# Patient Record
Sex: MALE | Race: WHITE
[De-identification: names, ages, dates, MRNs, and addresses within clinical notes are randomized per-mention and may not be internally consistent; named-entity substitution may affect disease eponyms.]

---

## 2017-04-11 ENCOUNTER — HOSPITAL ENCOUNTER (EMERGENCY)
Dept: HOSPITAL 25 - ED | Age: 57
LOS: 1 days | Discharge: HOME | End: 2017-04-12
Payer: COMMERCIAL
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
## 2017-04-11 DIAGNOSIS — S49.91XA: Primary | ICD-10-CM
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
## 2017-04-11 PROCEDURE — 99282 EMERGENCY DEPT VISIT SF MDM: CPT

## 2017-04-12 VITALS — DIASTOLIC BLOOD PRESSURE: 89 MMHG | SYSTOLIC BLOOD PRESSURE: 156 MMHG

## 2017-04-12 NOTE — RAD
INDICATION: Left hand injury     



COMPARISON: None



TECHNIQUE: AP, lateral, and oblique views were obtained.



FINDINGS: There is no acute fracture. There is a healed fracture the proximal phalanx of

the third digit. The articular relationships and soft tissues are normal for age.



IMPRESSION: NO ACUTE FRACTURE.

## 2017-04-12 NOTE — RAD
INDICATION: Right shoulder injury     



COMPARISON: None



TECHNIQUE: Routine frontal and Y views were obtained.



FINDINGS: There are no acute bony findings. 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

The glenohumeral joint is intact. The soft tissues are normal.



IMPRESSION: NO ACUTE FINDINGS.

## 2017-04-12 NOTE — ED
Sole VALENZUELA Erika, scribed for Jack Lopez MD on 04/11/17 at 2234 .





Adult Trauma





- HPI Summary


HPI Summary: 


Patient is a 57-year-old male presenting to the ED with a CC of constant, sudden

-onset right shoulder pain s/p fall yesterday. Patient reports that he was 

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 

4th finger. Patient reports pain to both of these locations. Shoulder pain is 

aggravated by moving the arm.





- History of Current Complaint


Chief Complaint: EDExtremityUpper


Stated Complaint: FALL/ RT SHOULDER /SWOLLEN LT RING FINGER


Time Seen by Provider: 04/11/17 22:23


Hx Obtained From: Patient


Mechanism of Injury: Fall


Onset/Duration: Started Days Ago, Traumatic, Still Present


Current Severity: Moderate


Pain Intensity: 8


Pain Scale Used: 0-10 Numeric


Location: Extremities - R shoulder, L 4th finger


Aggravating Factor(s): Movement





- Allergy/Home Medications


Allergies/Adverse Reactions: 


 Allergies











Allergy/AdvReac Type Severity Reaction Status Date / Time


 


No Known Allergies Allergy   Verified 04/11/17 21:19














PMH/Surg Hx/FS Hx/Imm Hx


Endocrine/Hematology History: Reports: Hx Diabetes


Cardiovascular History: Reports: Hx Hypertension


Infectious Disease History: No


Infectious Disease History: 


   Denies: Traveled Outside the [place] in Last 30 Days





- Family History


Known Family History: Positive: Cardiac Disease, Hypertension, Respiratory 

Disease - COPD





- Social History


Occupation: Employed Full-time


Lives: With Family


Alcohol Use: None


Hx Substance Use: No


Substance Use Type: Reports: None


Hx Tobacco Use: Yes - chewing tobacco


Smoking Status (MU): Never Smoked Tobacco


Do You Chew or Dip Tobacco: Yes





Review of Systems


Negative: Fever


Positive: Arthralgia - R shoulder, L 4th finger


All Other Systems Reviewed And Are Negative: Yes





Physical Exam


Triage Information Reviewed: Yes


Vital Signs On Initial Exam: 


 Initial Vitals











Temp Pulse Resp BP Pulse Ox


 


 98.2 F   87   15   140/77   97 


 


 04/11/17 21:13  04/11/17 21:13  04/11/17 21:13  04/11/17 21:13  04/11/17 21:13











Completion Of Physical Exam Limited Due To: Dementia


Appearance: Positive: Well-Appearing, No Pain Distress


Skin: Positive: Warm, Skin Color Reflects Adequate Perfusion, Dry


Head/Face: Positive: Normal Head/Face Inspection


Eyes: Positive: Normal


ENT: Positive: Normal ENT inspection


Neck: Positive: Supple, Nontender


Respiratory/Lung Sounds: Positive: Clear to Auscultation, Breath Sounds Present


Cardiovascular: Positive: RRR


Abdomen Description: Positive: Nontender, Soft


Bowel Sounds: Positive: Present


Musculoskeletal: Positive: Other - Deformed right upper arm and shoulder. NV 

and motor intact


Neurological: Positive: Normal


Psychiatric: Positive: Affect/Mood Appropriate





Diagnostics





- Vital Signs


 Vital Signs











  Temp Pulse Resp BP Pulse Ox


 


 04/11/17 21:15  98.2 F  87  15  140/77  97


 


 04/11/17 21:13  98.2 F  87  15  140/77  97














- Laboratory


Lab Statement: Any lab studies that have been ordered have been reviewed, and 

results considered in the medical decision making process.





- Radiology


  ** L Hand XR


Xray Interpretation: No Acute Changes


Radiology Interpretation Completed By: ED Physician





  ** R Humerus XR


Xray Interpretation: No Acute Changes


Radiology Interpretation Completed By: ED Physician





  ** R Shoulder XR


Radiology Interpretation Completed By: Radiologist - See [facility], pending at 

this time





Adult Trauma Course/Dx





- Course


Course Of Treatment: Mr. Mackenzie has a great deal of pain in his right 

shoulder to any ROM without an radiographic findings.  I will place him in an 

immobilizer and encourage F/U.  I warned him not to use the immobilizer more 

than a week without F/U.





- Diagnoses


Provider Diagnoses: 


 Shoulder injury








Discharge





- Discharge Plan


Condition: Stable


Disposition: HOME


Prescriptions: 


HYDROcodone/ACETAMIN 5-325 MG* [Norco 5-325 TAB*] 1 tab PO Q6H PRN #20 tab MDD 4


 PRN Reason: Pain


Patient Education Materials:  Shoulder Sprain (ED)


Referrals: 


Renay Tolliver MD [Primary Care Provider] - 


Additional Instructions: 


USE SHOULDER IMMOBILIZER. FOLLOW UP WITH YOUR PRIMARY CARE PHYSICIAN WITHIN A 

WEEK. DO NOT USE THE IMMOBILIZER FOR MORE THAN A WEEK. TAKE MEDICATION AS 

DIRECTED.





The documentation as recorded by the Sole shafer Erika accurately reflects 

the service I personally performed and the decisions made by me, Jack Lopez MD.

## 2017-04-12 NOTE — RAD
INDICATION: Injury right humerus     



COMPARISON: None



TECHNIQUE: AP, lateral, and oblique views were obtained.



FINDINGS: The bony structures, joint spaces, and soft tissues are normal for age.



IMPRESSION: NO ACUTE BONY FINDINGS

## 2017-08-22 ENCOUNTER — HOSPITAL ENCOUNTER (EMERGENCY)
Dept: HOSPITAL 25 - UCEAST | Age: 57
Discharge: HOME | End: 2017-08-22
Payer: COMMERCIAL

## 2017-08-22 VITALS — SYSTOLIC BLOOD PRESSURE: 137 MMHG | DIASTOLIC BLOOD PRESSURE: 85 MMHG

## 2017-08-22 DIAGNOSIS — I10: ICD-10-CM

## 2017-08-22 DIAGNOSIS — F17.220: ICD-10-CM

## 2017-08-22 DIAGNOSIS — E11.9: ICD-10-CM

## 2017-08-22 DIAGNOSIS — Z79.84: ICD-10-CM

## 2017-08-22 DIAGNOSIS — B02.9: Primary | ICD-10-CM

## 2017-08-22 PROCEDURE — G0463 HOSPITAL OUTPT CLINIC VISIT: HCPCS

## 2017-08-22 PROCEDURE — 99212 OFFICE O/P EST SF 10 MIN: CPT

## 2017-08-22 NOTE — ED
Skin Complaint





- HPI Summary


HPI Summary: 





Patient presents to the  with vesciular lesions under the right breast, right 

side body, right side of upper back which are following a linear pattern.  He 

has had chicken pox as a child.  He endorses pain over the lesions and has been 

using Vaseline for relief.  He first noticed the lesions approximately 3-4 days 

ago.  They have not gotten better or worse, but that pain has increased.  He 

states he has been under increased stress lately and PMHx includes diabetes and 

HTN.  Denies other complaints at this time.  No lesions are noted in the ear or 

around the eyes.  Denies numbness or tingling.





- History of Current Complaint


Chief Complaint: UCSkin


Time Seen by Provider: 08/22/17 19:35


Stated Complaint: BLISTERS AND RASH ON TORSO


Hx Obtained From: Patient


Onset/Duration: Started Days Ago - 4


Timing: Constant


Onset Severity: Moderate


Current Severity: Moderate


Pain Intensity: 0


Pain Scale Used: 0-10 Numeric


Skin Location: Chest - and back


Character: Swelling, Redness, Raised, Painful


Aggravating Symptom(s): Nothing


Alleviating Symptom(s): Nothing


Associated Signs & Symptoms: Negative


Related History: Possible Reaction to: Environmental Exposure





- Allergy/Home Medications


Allergies/Adverse Reactions: 


 Allergies











Allergy/AdvReac Type Severity Reaction Status Date / Time


 


No Known Allergies Allergy   Verified 08/22/17 19:30











Home Medications: 


 Home Medications





Atorvastatin* [Lipitor*] 40 mg PO 1700 08/22/17 [History Confirmed 08/22/17]


Gabapentin CAP(*) [Neurontin 300 CAP(*)] 300 mg PO DAILY 08/22/17 [History 

Confirmed 08/22/17]


Ibuprofen TAB* [Motrin TAB* 800 MG] 800 mg PO Q8H PRN 08/22/17 [History 

Confirmed 08/22/17]


Losartan Potassium [Cozaar] 50 mg PO DAILY 08/22/17 [History Confirmed 08/22/17]


Metformin HCl [Fortamet] 1,000 mg PO BID 08/22/17 [History Confirmed 08/22/17]


Phentermine HCl 37.5 mg PO DAILY 08/22/17 [History Confirmed 08/22/17]


Sitagliptin Phosphate [Januvia] 100 mg PO DAILY 08/22/17 [History Confirmed 08/ 22/17]


glipiZIDE TAB.XL* [Glucotrol XL*] 10 mg PO DAILY 08/22/17 [History Confirmed 08/ 22/17]











PMH/Surg Hx/FS Hx/Imm Hx


Previously Healthy: No - see below


Endocrine/Hematology History: Reports: Hx Diabetes


Cardiovascular History: Reports: Hx Hypertension





- Surgical History


Surgery Procedure, Year, and Place: HERNIA REPAIR





- Immunization History


Hx Pertussis Vaccination: No


Immunizations Up to Date: Unable to Obtain/Confirm


Infectious Disease History: No


Infectious Disease History: 


   Denies: Traveled Outside the [place] in Last 30 Days





- Family History


Known Family History: Positive: Cardiac Disease, Hypertension, Respiratory 

Disease - COPD





- Social History


Occupation: Employed Full-time


Lives: With Family


Alcohol Use: None


Hx Substance Use: No


Substance Use Type: Reports: None


Hx Tobacco Use: Yes - chewing tobacco


Smoking Status (MU): Never Smoked Tobacco


Type: Smokeless Tobacco





Review of Systems


Constitutional: Negative


Eyes: Negative


Cardiovascular: Negative


Respiratory: Negative


Positive: no symptoms reported, see HPI


Positive: Arthralgia, Myalgia - pain in right side over lesions


Positive: Other - vesicular lesions with minimal weeping


Neurological: Negative


Psychological: Normal


All Other Systems Reviewed And Are Negative: Yes





Physical Exam


Triage Information Reviewed: Yes


Vital Signs On Initial Exam: 


 Initial Vitals











Temp Pulse Resp BP Pulse Ox


 


 98.9 F   87   18   137/85   100 


 


 08/22/17 19:26  08/22/17 19:26  08/22/17 19:26  08/22/17 19:26  08/22/17 19:26











Vital Signs Reviewed: Yes


Appearance: Positive: Well-Appearing, Well-Nourished


Skin: Positive: Warm, Weeping Skin/Lesions - vesicles


Head/Face: Positive: Normal Head/Face Inspection, Temporal Artery Tenderness


Eyes: Positive: Normal, EOMI, JACKELYN, Conjunctiva Clear


Neck: Positive: Supple, No Lymphadenopathy


Respiratory/Lung Sounds: Positive: Clear to Auscultation, Breath Sounds Present


Cardiovascular: Positive: Normal


Musculoskeletal: Positive: Strength/ROM Intact


Neurological: Positive: Other - nerve pain over vesicular lesions


Psychiatric: Positive: Normal


AVPU Assessment: Alert





Diagnostics





- Vital Signs


 Vital Signs











  Temp Pulse Resp BP Pulse Ox


 


 08/22/17 19:26  98.9 F  87  18  137/85  100














- Laboratory


Lab Statement: Any lab studies that have been ordered have been reviewed, and 

results considered in the medical decision making process.





Course/Dx





- Course


Course Of Treatment: Patient evaluated for acute vesicular rash.  Pain is 

located over the rash, but denies elsewhere.  Chickenpox as a child and 

currently immunocompromised with increased stress levels and workload, diabetes 

and HTN.  The lesions are vesicles which are weeping and not crusted over.  

They follow a dermatome pattern and are painful to touch.  Valcyclovir 1g TID 

ordered for management.  Patient currently taking Gabapentin for neuropathy.  

Will defer to PCP for further pain management or increase dose of gabapentin if 

nerve pain worsens.  Patient is OK with discharge and return precautions and 

managment are given.





- Differential Diagnoses - Skin Complaint


Differential Diagnoses: Poison Ivy, Poison Oak, Varicella Zoster





- Diagnoses


Provider Diagnoses: 


 Varicella zoster








Discharge





- Discharge Plan


Condition: Stable


Disposition: HOME


Prescriptions: 


ValACYclovir (*) [Valtrex 1 GM(*)] 1 gm PO TID #21 tab MDD 3


Patient Education Materials:  Shingles (ED)


Referrals: 


Renay Tolliver MD [Primary Care Provider] - 


Additional Instructions: 


PREVENTING TRANSMISSION TO OTHERS 





Patients with herpes zoster can transmit varicella zoster virus (VZV) to 

individuals who have not had varicella and have not received the varicella 

vaccine. VZV is transmitted from person to person by direct contact or by 

aerosolization of virus from skin lesions. In general, VZV is much less 

transmissible from a person presenting with herpes zoster than from a person 

presenting with varicella. 





Patients with localized zoster are not infectious before vesicles appear and 

are no longer infectious when the lesions have re-epithelized. For those with 

active lesions, there are no specific precautions within the community setting. 

However, patients should be counseled about the risk of viral transmission to 

others. In addition, until the rash has crusted, patients should be advised to: 





Keep the rash covered, if feasible, and to wash their hands often to prevent 

the spread of virus to others. 








Avoid contact with pregnant women who have never had chickenpox or the 

varicella vaccine, premature or low birth weight infants, and immunocompromised 

individuals








Images





- Images


Full Body (No Head): 


  __________________________














  __________________________





 1 - vesicular lesions with minimal weeping





 2 - vesicular lesions following dermatome pattern

## 2018-05-21 ENCOUNTER — HOSPITAL ENCOUNTER (EMERGENCY)
Dept: HOSPITAL 25 - UCEAST | Age: 58
Discharge: HOME | End: 2018-05-21
Payer: COMMERCIAL

## 2018-05-21 VITALS — SYSTOLIC BLOOD PRESSURE: 140 MMHG | DIASTOLIC BLOOD PRESSURE: 83 MMHG

## 2018-05-21 DIAGNOSIS — T15.01XA: Primary | ICD-10-CM

## 2018-05-21 DIAGNOSIS — I10: ICD-10-CM

## 2018-05-21 DIAGNOSIS — E11.9: ICD-10-CM
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
## 2018-05-21 DIAGNOSIS — Z72.0: ICD-10-CM

## 2018-05-21 DIAGNOSIS — B02.9: ICD-10-CM
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
## 2018-05-21 PROCEDURE — G0463 HOSPITAL OUTPT CLINIC VISIT: HCPCS

## 2018-05-21 PROCEDURE — 99211 OFF/OP EST MAY X REQ PHY/QHP: CPT

## 2018-05-21 NOTE — UC
Renetta VALENZUELA Emily, scribed for Shankar Erwin MD on 05/21/18 at 0936 .





Eye Complaint HPI





- HPI Summary


HPI Summary: 


In Room:


This patient is a 58 year old M presenting to convenient care with a chief 

complaint of foreign body in R eye that he noticed at 1100 yesterday. Pt 

reports that he snapped the axle on his trailer, and had to repair it. Pt 

reports he believes something may have gone into his eye at this point. The 

patient rates the pain 6/10 in severity. Symptoms aggravated by nothing. 

Symptoms alleviated by nothing. Patient denies cough, CP, and SOB.





MD:


Vital signs stable. Afebrile. 6/10 R eye discomfort. Visit history 

noncontributory. Pt has HTN, diabetes, tobacco use and has had shingles.





Nurses:


Pt complains of fb in right eye. Pt states unsure of what is in right eye. Pt 

states pain began 5/20/18.








- History of Current Complaint


Chief Complaint: UCEye


Stated Complaint: EYE COMPLAINT


Time Seen by Provider: 05/21/18 09:11


Hx Obtained From: Patient


Onset/Duration: Sudden Onset, Lasting Hours, Still Present


Timing: Constant


Severity Initially: Moderate


Severity Currently: Moderate


Pain Intensity: 6


Pain Scale Used: 0-10 Numeric


Character: Foreign Body Sensation


Aggravating Factor(s): Nothing


Alleviating Factor(s): Nothing





- Allergies/Home Medications


Allergies/Adverse Reactions: 


 Allergies











Allergy/AdvReac Type Severity Reaction Status Date / Time


 


No Known Allergies Allergy   Verified 05/21/18 08:51











Home Medications: 


 Home Medications





Tetrahydroz/Peg 400/Hyprom/Gly [Visine Max Redness Relief Drop] 2 drop RIGHT 

EYE Q1HR PRN 05/21/18 [History Confirmed 05/21/18]











PMH/Surg Hx/FS Hx/Imm Hx


Previously Healthy: No


Endocrine History: Diabetes


Cardiovascular History: Hypertension





- Surgical History


Surgical History: Yes


Surgery Procedure, Year, and Place: HERNIA REPAIR.  Right shoulder surgery





- Family History


Known Family History: Positive: Cardiac Disease, Hypertension, Respiratory 

Disease - COPD





- Social History


Occupation: Retired


Lives: With Family


Alcohol Use: None


Substance Use Type: None


Smoking Status (MU): Never Smoked Tobacco


Type: Smokeless Tobacco


Amount Used/How Often: 2 can weekly





Review of Systems


Eyes: Other - Positive foreign body sensation in R eye


Respiratory: Other - Negative cough and SOB


Cardiovascular: Other - Negative CP


All Other Systems Reviewed And Are Negative: Yes





Physical Exam





- Summary


Physical Exam Summary: 


Appearance: The patient is well-appearing, is in no pain distress, and is well-

nourished.


Eyes: PUPILS EQUAL AND REACTIVE TO LIGHT AND ACCOMODATION. NO VISIBLE INJURY. 

RIGHT SCLERA SLIGHTLY INJECTED. EXAMINATION OF THE EYE SHOWS A PINPOINT FOREIGN 

BODY THAT IS PROBABLY RUST AT 7 OCLOCK IN THE RIGHT EYE.


ENT: The hearing is grossly normal, the pharynx is normal, and the TMs are 

normal. There is no muffled or hoarse voice.


Neck: The neck is supple and there is no lymphadenopathy.


Respiratory: The chest is nontender. The lungs are clear, there are normal 

breath sounds, and there is no respiratory distress.


Cardiovascular: Heart is regular rate and rhythm. There is no murmur.


Abdomen: The abdomen is soft and nontender. There is no organomegaly.


Bowel sounds: present


Musculoskeletal: Strength is intact. The patient moves all extremities.


Neurological: The patient is alert.


Psychological: The patient displays age appropriate behavior


Skin: Negative for rashes. 





Triage Information Reviewed: Yes


Vital Signs: 


 Initial Vital Signs











Temp  98.1 F   05/21/18 08:54


 


Pulse  81   05/21/18 08:54


 


Resp  16   05/21/18 08:54


 


BP  140/83   05/21/18 08:54


 


Pulse Ox  98   05/21/18 08:54











Vital Signs Reviewed: Yes





Eye Complaint Course/Dx





- Course


Course Of Treatment: Examination of the eye shows a pinpoint foreign body that 

is probably rust at 7 oclock in the right eye. After fluorescein and tetracaine

, attempt to remove it was made with a moist swab. Since this occurred yesterday

, the foreign body appears to be embedded. He will be seen and evaluated by Dr. Bose. Elevated BP but has current hypertension diagnosis and treatment. This 

should be rechecked a few times in the next month. Medications have been 

included in the original chart and reviewed.





- Differential Dx/Diagnosis


Provider Diagnoses: Corneal foreign body, 1 day old.





Discharge





- Sign-Out/Discharge


Documenting (check all that apply): Discharge/Admit/Transfer





- Discharge Plan


Condition: Stable


Disposition: HOME


Patient Education Materials:  Eye Foreign Body (ED)


Referrals: 


Renay Tolliver MD [Primary Care Provider] - 


Additional Instructions: 


SEEK CARE AT THE EMERGENCY DEPARTMENT IF SYMPTOMS WORSEN OR IF NEW SYMPTOMS 

DEVELOP.


FOLLOW UP WITH YOUR PRIMARY CARE PHYSICIAN.


Your blood pressure reading today was 140/83, indicating HYPERTENSION. Follow-

up with your primary care provider within 4 weeks for blood pressure readings 

and further evaluation. 





AS WE DISCUSSED:  





1.  You have a foreign body, probably rust, at 7 o'clock in the right eye.





2.  Go to Dr. Bose's office to have this removed.  They are expecting you.











- Billing Disposition and Condition


Condition: STABLE


Disposition: HOME





The documentation as recorded by the Renetta shafer Emily accurately reflects the 

service I personally performed and the decisions made by me, Shankar Erwin MD.

## 2019-03-25 ENCOUNTER — HOSPITAL ENCOUNTER (OUTPATIENT)
Dept: HOSPITAL 25 - ED | Age: 59
Setting detail: OBSERVATION
LOS: 1 days | Discharge: HOME | End: 2019-03-26
Attending: HOSPITALIST | Admitting: HOSPITALIST
Payer: COMMERCIAL

## 2019-03-25 DIAGNOSIS — I10: ICD-10-CM

## 2019-03-25 DIAGNOSIS — E86.0: ICD-10-CM

## 2019-03-25 DIAGNOSIS — E78.5: ICD-10-CM

## 2019-03-25 DIAGNOSIS — Z79.82: ICD-10-CM

## 2019-03-25 DIAGNOSIS — F17.210: ICD-10-CM

## 2019-03-25 DIAGNOSIS — E11.9: ICD-10-CM

## 2019-03-25 DIAGNOSIS — R20.0: Primary | ICD-10-CM

## 2019-03-25 LAB
ALBUMIN SERPL BCG-MCNC: 4.2 G/DL (ref 3.2–5.2)
ALBUMIN/GLOB SERPL: 1.3 {RATIO} (ref 1–3)
ALP SERPL-CCNC: 102 U/L (ref 34–104)
ALT SERPL W P-5'-P-CCNC: 22 U/L (ref 7–52)
ANION GAP SERPL CALC-SCNC: 10 MMOL/L (ref 2–11)
APTT PPP: 31.4 SECONDS (ref 26–36.3)
AST SERPL-CCNC: 23 U/L (ref 13–39)
BASOPHILS # BLD AUTO: 0.1 10^3/UL (ref 0–0.2)
BUN SERPL-MCNC: 28 MG/DL (ref 6–24)
BUN/CREAT SERPL: 17.3 (ref 8–20)
CALCIUM SERPL-MCNC: 9.3 MG/DL (ref 8.6–10.3)
CHLORIDE SERPL-SCNC: 107 MMOL/L (ref 101–111)
CHOLEST SERPL-MCNC: 118 MG/DL
EOSINOPHIL # BLD AUTO: 0.3 10^3/UL (ref 0–0.6)
GLOBULIN SER CALC-MCNC: 3.3 G/DL (ref 2–4)
GLUCOSE SERPL-MCNC: 241 MG/DL (ref 70–100)
HCO3 SERPL-SCNC: 22 MMOL/L (ref 22–32)
HCT VFR BLD AUTO: 45 % (ref 36–46)
HDLC SERPL-MCNC: 31.7 MG/DL
HGB BLD-MCNC: 15.1 G/DL (ref 14–18)
INR PPP/BLD: 0.84 (ref 0.77–1.02)
LYMPHOCYTES # BLD AUTO: 2.7 10^3/UL (ref 1–4.8)
MCH RBC QN AUTO: 29 PG (ref 27–31)
MCHC RBC AUTO-ENTMCNC: 34 G/DL (ref 31–36)
MCV RBC AUTO: 87 FL (ref 80–94)
MONOCYTES # BLD AUTO: 1.3 10^3/UL (ref 0–0.8)
NEUTROPHILS # BLD AUTO: 6.4 10^3/UL (ref 1.5–7.7)
NRBC # BLD AUTO: 0 10^3/UL
NRBC BLD QL AUTO: 0.1
PLATELET # BLD AUTO: 213 10^3/UL (ref 150–450)
POTASSIUM SERPL-SCNC: 4.2 MMOL/L (ref 3.5–5)
PROT SERPL-MCNC: 7.5 G/DL (ref 6.4–8.9)
RBC # BLD AUTO: 5.16 10^6 /UL (ref 4.18–5.48)
SODIUM SERPL-SCNC: 139 MMOL/L (ref 135–145)
TRIGL SERPL-MCNC: 294 MG/DL
TROPONIN I SERPL-MCNC: 0.01 NG/ML (ref ?–0.04)
WBC # BLD AUTO: 10.8 10^3/UL (ref 3.5–10.8)

## 2019-03-25 PROCEDURE — 93005 ELECTROCARDIOGRAM TRACING: CPT

## 2019-03-25 PROCEDURE — 36415 COLL VENOUS BLD VENIPUNCTURE: CPT

## 2019-03-25 PROCEDURE — G0378 HOSPITAL OBSERVATION PER HR: HCPCS

## 2019-03-25 PROCEDURE — 85730 THROMBOPLASTIN TIME PARTIAL: CPT

## 2019-03-25 PROCEDURE — 96372 THER/PROPH/DIAG INJ SC/IM: CPT

## 2019-03-25 PROCEDURE — 81003 URINALYSIS AUTO W/O SCOPE: CPT

## 2019-03-25 PROCEDURE — 80048 BASIC METABOLIC PNL TOTAL CA: CPT

## 2019-03-25 PROCEDURE — 83605 ASSAY OF LACTIC ACID: CPT

## 2019-03-25 PROCEDURE — 70551 MRI BRAIN STEM W/O DYE: CPT

## 2019-03-25 PROCEDURE — 71045 X-RAY EXAM CHEST 1 VIEW: CPT

## 2019-03-25 PROCEDURE — 84484 ASSAY OF TROPONIN QUANT: CPT

## 2019-03-25 PROCEDURE — 70450 CT HEAD/BRAIN W/O DYE: CPT

## 2019-03-25 PROCEDURE — 85025 COMPLETE CBC W/AUTO DIFF WBC: CPT

## 2019-03-25 PROCEDURE — 70544 MR ANGIOGRAPHY HEAD W/O DYE: CPT

## 2019-03-25 PROCEDURE — 99285 EMERGENCY DEPT VISIT HI MDM: CPT

## 2019-03-25 PROCEDURE — 93306 TTE W/DOPPLER COMPLETE: CPT

## 2019-03-25 PROCEDURE — 80053 COMPREHEN METABOLIC PANEL: CPT

## 2019-03-25 PROCEDURE — 85610 PROTHROMBIN TIME: CPT

## 2019-03-25 PROCEDURE — 96374 THER/PROPH/DIAG INJ IV PUSH: CPT

## 2019-03-25 PROCEDURE — 93880 EXTRACRANIAL BILAT STUDY: CPT

## 2019-03-25 PROCEDURE — 80061 LIPID PANEL: CPT

## 2019-03-25 NOTE — ED
Neurological HPI





- HPI Summary


HPI Summary: 


Pt is a 58 y/o male who presents to the ED c/o numbness. At 18:00 the entire 

right side of his body became painful, fatigued, and numb. He then stumbled 

over into a wall due to ataxia. As per wife, his face became very reddened and 

he was not answering questions, rather just staring straight ahead. The entire 

episode lasted for 1 hour, and wife states pt was upset and crying. The pain is 

rated a 5/10 in severity. He also c/o paresthesia of his fingers and dehydration

, but denies any slurred speech or near-syncope. Upon arrival to the ED he 

needed assistance getting out of his car. He is a diabetic and his last BG 

reading was 253. Pt denies any alcohol or drug use. PMHx HTN.





- History of Current Complaint


Chief Complaint: EDWeakness


Stated Complaint: RIGHT SIDED PAIN PER NURSE


Time Seen by Provider: 03/25/19 18:31


Hx Obtained From: Patient, Family/Caretaker - Wife


Onset/Duration: Sudden Onset, Started hours ago - 18:00 tonight, Resolved


Timing: Intermittent Episodes Lasting: - 1 hour


Neurological Deficit Location: RUE, RLE


Pain Intensity: 5


Pain Scale Used: 0-10 Numeric


Character: Numbness/Tingling, Lethargy


Aggravating: Nothing


Alleviating: Spontanious Resolution





- Allergy/Home Medications


Allergies/Adverse Reactions: 


 Allergies











Allergy/AdvReac Type Severity Reaction Status Date / Time


 


No Known Allergies Allergy   Verified 05/21/18 08:51














PMH/Surg Hx/FS Hx/Imm Hx


Endocrine/Hematology History: Reports: Hx Diabetes


Cardiovascular History: Reports: Hx Hypertension


GI History: Reports: Other GI Disorders - hernia





- Surgical History


Surgery Procedure, Year, and Place: HERNIA REPAIR.  Right shoulder surgery


Infectious Disease History: No


Infectious Disease History: Reports: Hx Shingles


   Denies: Traveled Outside the [place] in Last 30 Days





- Family History


Known Family History: Positive: Cardiac Disease, Hypertension, Respiratory 

Disease - COPD





- Social History


Alcohol Use: None


Hx Substance Use: No


Substance Use Type: Reports: None


Hx Tobacco Use: Yes - chewing tobacco


Smoking Status (MU): Current Some Day Smoker


Type: Smokeless Tobacco


Amount Used/How Often: 2 can weekly





Review of Systems


Positive: Fatigue, Other - dehydration


Positive: Myalgia - right side of body


Positive: Other - face reddened - resolved


Neurological: Other - ataxia


Positive: Paresthesia - fingers, Numbness - right side of body.  Negative: 

Syncope - near, Slurred Speech


All Other Systems Reviewed And Are Negative: Yes





Physical Exam





- Summary


Physical Exam Summary: 


Appearance: well appearing, no pain distress


Skin: warm, dry, reflects adequate perfusion


Head/face: normal


Eyes: EOMI, JACKELYN


ENT: mucous membranes moist


Neck: supple, non-tender


Respiratory: CTA, breath sounds present


Cardiovascular: RRR, pulses symmetrical 


Abdomen: non-tender, soft


Bowel Sounds: present


Musculoskeletal: normal, strength/ROM intact


Neuro: normal, sensory motor intact, A&Ox3, stands and walks normally


Psych: flat affect


Triage Information Reviewed: Yes


Vital Signs On Initial Exam: 


 Initial Vitals











Temp Pulse Resp BP Pulse Ox


 


 99.2 F   78   19   124/78   97 


 


 03/25/19 18:34  03/25/19 18:34  03/25/19 18:34  03/25/19 18:34  03/25/19 18:34











Vital Signs Reviewed: Yes





- Amy Coma Scale


Best Eye Response: 4 - Spontaneous


Best Motor Response: 6 - Obeys Commands


Best Verbal Response: 5 - Oriented


Coma Scale Total: 15





Diagnostics





- Vital Signs


 Vital Signs











  Temp Pulse Resp BP Pulse Ox


 


 03/25/19 18:34  99.2 F  78  19  124/78  97














- Laboratory


Lab Results: 


 Lab Results











  03/25/19 Range/Units





  18:33 


 


POC Glucose (mg/dL)  253 H  ()  mg/dL











Result Diagrams: 


 03/25/19 19:15





 03/25/19 19:15


Lab Statement: Any lab studies that have been ordered have been reviewed, and 

results considered in the medical decision making process.





- Radiology


  ** CXR


Radiology Interpretation Completed By: ED Physician


Summary of Radiographic Findings: No acute findings. Pending official radiology 

report.





- CT


  ** Brain CT


CT Interpretation Completed By: Radiologist


Summary of CT Findings: 1. No acute intracranial hemorrhage or acute 

territorial type infarct.  2. There are scattered foci of hypodensity, likely 

representing small vessel ischemic disease in a patient this age.  3. Mild 

atrophy.  4. If further evaluation is clinically indicated, an MRI of the brain 

is.  recommended.  ED physician reviewed radiology report.





- EKG


  ** 19:20


Cardiac Rate: NL - 80 bpm


EKG Rhythm: Sinus Rhythm


ST Segment: Normal


Summary of EKG Findings: Nl axis, nl intervals





NIH Scale





- NIH Scale


Level of Consciousness: Alert/Keenly Responsive


Ask Patient the Month and His/Her Age: Both Correct


Ask Pt to Open/Close Eyes and /Release Non-Paretic Hand: Both Correctly


Best Gaze (Only Horizontal Eye Movement): Normal


Visual Field Testing: No Visual Loss


Facial Paresis-Pt to Smile & Close Eyes or Grimace Symmetry: Normal/Symmetrical


Motor Function - Right Arm: No Drift-Holds 10 Seconds


Motor Function - Left Arm: No Drift-Holds 10 Seconds


Motor Function - Right Leg: No Drift-Holds 10 Seconds


Motor Function - Left Leg: No Drift-Holds 10 Seconds


Limb Ataxia-Must be out of Proportion to Weakness Present: Absent


Sensory (Use Pinprick to Test Arms/Legs/Trunk/Face): Normal


Best Language (Describe Picture, Name Items): No Aphasia


Dysarthria (Read Several Words): Normal


Extinction and Inattention: No Abnormality


Total Score: 0





Course/Dx





- Course


Course Of Treatment: Nurse's notes reviewed.  Patient presents with abrupt 

onset of neurologic symptoms affecting the right side of his body with some 

ataxia.  His NIH on exam is 0 however he required assistance in getting out of 

the car.  His symptoms lasted maybe one hour.  Teleneurology consultation was 

made with stroke neurologist at [facility].  He agreed at likely 

TIA and agreed that the patient was not a candidate for TPA.  The patient was 

given IV fluids and full dose aspirin.  He had no recurrence of his neurologic 

symptoms.  He does have risk factors for cardiovascular disease including 

diabetes and hypertension.  He will be admitted to the hospitalist service for 

further evaluation and rule out.





- Differential Dx


Differential Diagnoses Neuro: Positive: Benign Paroxysmal Positional Vertigo, 

Cerebrovascular Accident, Hypoglycemia, Hypothermia, Intracranial Bleed, 

Medication Reaction, Metabolic Abnormality, Transient Ischemic Attack, 

Vasovagal Reaction, Other - Mental health/conversion disorder





- Diagnoses


Provider Diagnoses: 


 TIA (transient ischemic attack)








- Physician Notifications


Discussed Care Of Patient With: Jose Alfredo Davis


Time Discussed With Above Provider: 20:30


Instructed by Provider To: Other - Dr. Davis from [facility] neurology 

is on for a teleconsult. He recommends admission to rule out TIA. At 21:05 Dr. Freeman accepts for admission.





- Critical Care Time


Critical Care Time: 30-74 min - Critical care time is exclusive of separately 

billable procedures





Discharge





- Sign-Out/Discharge


Documenting (check all that apply): Patient Departure - Admit


Patient Received Moderate/Deep Sedation with Procedure: No





- Discharge Plan


Condition: Stable


Disposition: ADMITTED TO [facility]


Referrals: 


Leonid Melara MD [Primary Care Provider] - 





- Billing Disposition and Condition


Condition: STABLE


Disposition: Admitted to [facility]





- Attestation Statements


Document Initiated by Scribe: Yes


Documenting Scribe: Jael Steinberg


Provider For Whom Viridiana is Documenting (Include Credential): Jaxson Turner MD


Scribe Attestation: 


Jael VALENZUELA, scribed for Jaxson Turner MD on 03/25/19 at 2131. 


Scribe Documentation Reviewed: Yes


Provider Attestation: 


The documentation as recorded by the Jael shafer accurately reflects the 

service I personally performed and the decisions made by Jaxson hirsch MD


Status of Scribe Document: Viewed

## 2019-03-26 VITALS — SYSTOLIC BLOOD PRESSURE: 116 MMHG | DIASTOLIC BLOOD PRESSURE: 70 MMHG

## 2019-03-26 LAB
ANION GAP SERPL CALC-SCNC: 8 MMOL/L (ref 2–11)
BUN SERPL-MCNC: 24 MG/DL (ref 6–24)
BUN/CREAT SERPL: 17.3 (ref 8–20)
CALCIUM SERPL-MCNC: 9.3 MG/DL (ref 8.6–10.3)
CHLORIDE SERPL-SCNC: 108 MMOL/L (ref 101–111)
GLUCOSE SERPL-MCNC: 123 MG/DL (ref 70–100)
HCO3 SERPL-SCNC: 24 MMOL/L (ref 22–32)
POTASSIUM SERPL-SCNC: 4.1 MMOL/L (ref 3.5–5)
SODIUM SERPL-SCNC: 140 MMOL/L (ref 135–145)
TROPONIN I SERPL-MCNC: 0.01 NG/ML (ref ?–0.04)

## 2019-03-26 RX ADMIN — INSULIN LISPRO SCH UNIT: 100 INJECTION, SOLUTION INTRAVENOUS; SUBCUTANEOUS at 00:02

## 2019-03-26 RX ADMIN — INSULIN LISPRO SCH UNIT: 100 INJECTION, SOLUTION INTRAVENOUS; SUBCUTANEOUS at 09:12

## 2019-03-26 RX ADMIN — INSULIN LISPRO SCH UNIT: 100 INJECTION, SOLUTION INTRAVENOUS; SUBCUTANEOUS at 12:23

## 2019-03-26 NOTE — ADMNOTE
Subjective


Date of Service: 19


Interval History: 





HISTORY AND PHYSICAL





PCP: [facility]





CC: RT side numbness





HPI: Patient is 59 year old with diabetes, hypertension, who was driving car 

yesterday afternoon, had sudden onset of numbness and weakness in whole RT side 

of body. He was able to stop car. Wife was with him, convinced him to come to 

ER by car.  At ER, he noted ataxia and weakness in RT leg and arm, needed 

assistance to get out of car to stretcher. In ER neurologic exam normalized. He 

had tele-stroke consult and aspirin daily was advised.





Denies headache. Denies similar episodes in past. 


Family History: Findings - father  of alcoholism, mother  of MI, 

Brother alive with CAD


Social History: Findings - , 2 children, raising grandson, retired, no 

smoking, no alcohol, no drug use


Past Medical History: Findings - Type 2 diabetes, hypertension; PSH: RT 

shoulder repair, umbilical hernia repair





Review of Systems





- Measurements


Intake and Output: 


Intake and Output Last 24 Hours











 19





 06:59 06:59 06:59 06:59


 


Weight    132.449 kg














- Review of Systems


Constitutional Symptoms: 


   Negative: Weight Loss, Fatigue, Fever


Dermatology: Positive: Normal


HEENT: Positive: Normal


Eyes: Positive: Normal


   Negative: Change in Vision, Double Vision


Thyroid: Positive: Normal


Pulmonary: Positive: Normal


Cardiology: Positive: Normal


   Negative: Chest Pain, Shortness of Breath


Gastroenterology: Positive: Normal


   Negative: Nausea, Vomiting, Diarrhea


Genital - Urinary: Positive: Normal


Endocrinology: Positive: Diabetes Mellitus


Neurology: Positive: Change in Balancing, Change in Walking, Numbness\

Paresthesiae, Unexplained Weakness


   Negative: Headache, Migraines, Change in Speech, Hx of Stroke\TIA, Hx of 

Seizures


Psychiatry: Positive: Normal





Objective


Active Medications: 








Aspirin (Aspirin 81 Mg Chew Tab*)  81 mg PO DAILY WITH MEAL [facility]


Atorvastatin Calcium (Lipitor*)  40 mg PO 1700 [facility]


Glipizide (Glucotrol Xl*)  10 mg PO DAILY [facility]


Losartan Potassium (Cozaar Tab*)  50 mg PO DAILY [facility]


Metformin HCl (Glucophage*)  1,000 mg PO BID [facility]


Nfm* (Empagliflozin ([Jardiance] 10 Mg))  10 mg PO DAILY WITH MEAL [facility]


Sitagliptin Phosphate (Januvia (Nf))  100 mg PO DAILY [facility]; Protocol








 Vital Signs - 8 hr











  19





  18:34 18:37 18:38


 


Temperature 37.3 C  


 


Pulse Rate 78 79 81


 


Respiratory 19 24 28





Rate   


 


Blood Pressure 124/78  129/72





(mmHg)   


 


O2 Sat by Pulse 97 96 96





Oximetry   

















  19





  22:09 22:39 23:00


 


Temperature   


 


Pulse Rate 72 72 68


 


Respiratory 25 21 27





Rate   


 


Blood Pressure 127/74 120/72 





(mmHg)   


 


O2 Sat by Pulse 94 96 95





Oximetry   














  19





  23:10 23:21


 


Temperature  37.2 C


 


Pulse Rate 75 76


 


Respiratory 16 16





Rate  


 


Blood Pressure 119/71 119/71





(mmHg)  


 


O2 Sat by Pulse 95 98





Oximetry  











Oxygen Devices in Use Now: None


Appearance: alert, no distress, obese


Eyes: No Scleral Icterus


Ears/Nose/Mouth/Throat: NL Teeth, Lips, Gums, Clear Oropharnyx


Neck: NL Appearance and Movements; NL JVP


Respiratory: Symmetrical Chest Expansion and Respiratory Effort, Clear to 

Auscultation


Cardiovascular: NL Sounds; No Murmurs; No JVD, RRR


Abdominal: NL Sounds; No Tenderness; No Distention, No Hepatosplenomegaly


Lymphatic: No Cervical Adenopathy


Extremities: No Edema


Skin: No Rash or Ulcers


Neurological: Alert and Oriented x 3, NL Sensation, NL Muscle Strength and Tone


Lines/Tubes/Other Access: Clean, Dry and Intact Peripheral IV


Nutrition: Taking PO's


Result Diagrams: 


 19 19:15





 19 19:15


Additional Lab and Data: 


 Laboratory Tests











  19





  18:33 19:15 19:15


 


INR (Anticoag Therapy)   0.84 


 


APTT   31.4 


 


Glucose    241 H


 


POC Glucose (mg/dL)  253 H  


 


Lactic Acid   


 


Troponin I    0.01


 


Cholesterol    118


 


LDL Cholesterol    28


 


HDL Cholesterol    31.7


 


Ur Specific Gravity   


 


Urine Glucose   














  19





  19:15 21:18


 


INR (Anticoag Therapy)  


 


APTT  


 


Glucose  


 


POC Glucose (mg/dL)  


 


Lactic Acid  1.9 


 


Troponin I  


 


Cholesterol  


 


LDL Cholesterol  


 


HDL Cholesterol  


 


Ur Specific Gravity   1.025


 


Urine Glucose   3+(>=500 mg/dl) A














Diagnostic Imaging: 





Head CT: no infarct


CXR: negative


EKG Data: 





NSR, normal axis , Qs in III, aVF, possible old IMI





Assess/Plan/Problems-Billing


Assessment: 


59 year old man admitted w/ episode RT arm numbness, ataxia, possible TIA








- Patient Problems


(1) TIA (transient ischemic attack)


Current Visit: Yes   Status: Acute   Priority: High   Code(s): G45.9 - 

TRANSIENT CEREBRAL ISCHEMIC ATTACK, UNSPECIFIED   SNOMED Code(s): 089722394


   Comment: -Differential would include cardioembolic TIA, carotid embolism 

source, seizure, or somatization.


-Will be admitted to observation, follow rhythm on telemetry


-Will need MRI to assess for small stroke


-Will have carotid doppler and echocardiogram to assess for embolic source.


-Started on aspirin daily.   





(2) Type 2 diabetes mellitus


Current Visit: Yes   Status: Acute   Priority: Medium   Comment: -Will continue 

outpatient diabetes regimen and check finger stick glucose at AC and HS.


-Supplemental sliding scale humalog will be available.   





(3) DVT prophylaxis


Current Visit: Yes   Status: Acute   Priority: Low   Code(s): DOJ5985 -    

SNOMED Code(s): 468311486


   Comment: -Moderate risk


-SC lovenox   


Status and Disposition: 





observation

## 2019-03-26 NOTE — CONS
CC:  Leonid Melara MD

 

CONSULTATION REPORT:

 

DATE OF CONSULT:  03/26/19

 

LOCATION:  Currently in room 446, bed 2.

 

PRIMARY CARE PHYSICIAN:  Leonid Melara MD.

 

REASON FOR CONSULT:  Acute onset right-sided symptoms, numbness and tingling 
with risk factors.

 

HISTORY OF PRESENT ILLNESS:  Mr. Mackenzie is a 59-year-old gentleman with a 
history of hypertension, diabetes, possibly hypercholesterolemia, who was in 
his usual state of health when yesterday at around 5 p.m. while driving his 
grandson home, he developed acute onset of right arm and leg numbness and 
tingling.  He denied any specific weakness on that side.  His wife states that 
his face turned very red, that he he did not speak for about 10 minutes and 
then he started to cry afterwards.  She denied any julian facial weakness.  The 
symptoms lasted for about an hour.  He did come to the ER and in the ER, it was 
reported that he also noted some weakness and ataxia, although he denied that 
today in the arm and leg. Apparently during his ER visit, his exam returned to 
normal.  Telestroke consult was done and daily aspirin was advised.  He reports 
being on aspirin prior to this hospitalization.  He denies any focal left-sided 
symptoms.  He denies any vision symptoms including double vision or vision 
loss.  Denies any recent illnesses, fevers, chills, palpitations or chest pain, 
shortness of breath.  He has had no falls or head trauma.  He states that he 
feels back to his normal self.  He has never had any of these symptoms prior.  
He states that he is compliant with his medications at home.  He notes no 
problem swallowing.  He did note some problems with speech for a few minutes, 
states that it got better.  He states that he had a headache afterwards, 
generalized in nature, dull and aching, moderate but he has no history of 
migraine headaches in the past or similar focal neurologic findings of 
presentation.  This morning, he is walking around, feeling better, states that 
he is back to his baseline.

 

PAST MEDICAL HISTORY:  As noted above.

 

PAST SURGICAL HISTORY:  Includes surgical repair of his right shoulder and 
umbilical hernia repair in the distant past.

 

MEDICATIONS AT HOME:  Include:

1.  Losartan.

2.  Hydrochlorothiazide.

3.  Lipitor 40.

4.  Gabapentin.

5.  Ibuprofen 800 mg q.8 hours.

6.  Glipizide.

7.  Januvia.

8.  Phentermine 37.5.

9.  Metformin.

10.  Aspirin 81 mg a day.

11.  Jardiance.

 

ALLERGIES:  No known drug allergies.

 

FAMILY HISTORY:  Father with alcoholism, mother with the heart attack, brother 
with coronary artery disease.

 

SOCIAL HISTORY:  He is .  His wife is at the bedside.  He has 2 
children. His grandson is living with him.  He is a johnson.  He denies any 
significant tobacco, alcohol, or drug use.

 

REVIEW OF SYSTEMS:  Review of systems in 14-organ systems as noted above, 
otherwise negative.

 

PHYSICAL EXAM:  



Vital Signs:  Temperature of 97.8, blood pressure 111/63 to 125/72 to 119/71.  
Pulse has been in the 60s to 70s.  Respiratory rate 16 to 20s.  O2 saturation 95
% to 98% on room air.  



In general, he is a well-nourished, well- developed, obese gentleman, in no 
acute distress.  He is sitting on the side of his hospital bed.  He gets up, 
walks around.  He is very pleasant.  Wife is at the bedside.  HEENT:  He is 
normocephalic, atraumatic.  Sclerae anicteric.  Mucous membranes are moist.  
Oropharynx is clear.  Nares are patent.  Neck is supple.  No thyromegaly.  No 
carotid bruits.  No meningismus.  Chest:  Clear to auscultation bilaterally.  
Cardiovascular is regular rate and rhythm without murmurs.  Abdomen is nontender
, nondistended.  Extremities:  No clubbing, cyanosis, or edema.  Skin is warm 
and dry.  He has tattoos scattered.  



On neurologic exam, he is awake, alert, and oriented x3.  His speech is fluent.
  There is no dysarthria.  Repetition is intact.  Recall of recent and remote 
events is intact.  Vocabulary is intact. His mood is dysthymic.  Affect is mood 
congruent.  Cranial Nerves II through XII: Pupils are equally round and 
reactive to light and accommodation.  Extraocular muscles are intact in all 
quadrants.  Visual fields are full throughout.  Face is symmetric.  Facial 
sensation is intact to light touch.  Oropharynx raises symmetrically.  Hearing 
is intact.  Tongue is midline.  Sternocleidomastoid and trapezius are 5/5.  
Motor Exam:  He is 5/5 throughout.  Good tone and bulk.  No drift in the upper 
and lower extremities.  Finger-to-nose rapid alternating movements were intact 
without tremors.  No dysdiadochokinesia.  Sensation is intact to light touch 
and pinprick throughout.  DTRs are 2+ and symmetric in the upper and lower 
extremities with equivocal Babinski.  Gait is normal base, normal arm swing, 
steady.  Romberg negative.

 

DIAGNOSTIC STUDIES/LAB DATA:  Lab work includes CBC with diff is essentially 
normal.  INR of 0.84.  PTT of 31.4.  Chemistry with a BUN of 28.  Creatinine of 
1.62 on admission, 1.39 this morning.  Glucose 241 to 170 to 123 to 164.  
Lactic acid 1.9.  Triglycerides of 294, cholesterol of 118, LDL 28, HDL 31.7.  
Troponin negative.  Urine 3+ glucose.

 

Studies:  He had a brain CT which showed some white matter disease.  No acute 
issues.  No infarcts appreciated.  Mild atrophy.  He had a chest x-ray which 
showed no evidence of active disease.  Telemetry negative.  Carotid ultrasound, 
MRA and MRI of the head are pending.  Echocardiogram is pending.

 

ASSESSMENT AND PLAN:  Mr. Mackenzie is a 59-year-old gentleman with multiple 
stroke risk factors including diabetes; hypertension; hypercholesterolemia, on 
medications, who presented to the hospital with acute onset of right arm and 
leg numbness and tingling, possibly some weakness.  Although he downplays that, 
his wife states that he had no facial droop, but his face turned very red.  He 
did not speak for about 10 minutes and then started talking again.  He has 
never had any similar issues in the past.  He is on daily aspirin at home.  A 
code gray was called as he arrived within an hour and based on his presentation 
at the time, they recommended aspirin.  No need for intervention.  Overnight, 
he has returned to baseline.  He has not had any new issues.  He feels okay, 
somewhat concerned about his presentation.

 

PLAN:

1.  Will order MRI and MRA of the head, to look for any acute stroke, to look 
for any evidence of stenosis or other vascular issues.

2.  Carotid ultrasound of the neck to look for any stenosis, thrombus or 
dissection.

3.  Echocardiogram.

4.  Continue tele to look for any evidence of atrial fibrillation.

5.  I will continue aspirin.  I have added Plavix loading dose, 300mg and would 
start 75 mg a day and I would treat with dual antiplatelet therapy for 30 days 
and then stop the aspirin and continue the Plavix as he may have aspirin 
resistance.  I would consider this is a high-risk TIA given his multiple risk 
factors.  I think dual antiplatelet is appropriate.  

6.  Continue his statin.  His LDL cholesterol is very good.  

7.  Continue tight blood pressure control.  

8.  Continue tight glucose control at this point.  



He is back to his baseline with no focal deficits.  No need for any physical 
therapy at this point.  If his studies come back negative today, I think it is 
okay for him to go home.  I told him to return to the ER immediately should he 
have any new focal symptoms or concerns.  I plan to see him back in clinic.  
You can make a followup with me in 2 to 3 months.  I will make further 
recommendations as necessary.

 

Thank you for the opportunity to participate in the care of this very 
interesting patient.

 

 [id number]/CPS #: [id number]

ISREAL

## 2019-03-26 NOTE — ECHO
Patient:      LINDA PACHECO  

Med Rec#:     [id number]            :          1960          

Date:         2019            Age:          59y                 

Account#:     [id number]          Height:       180 cm / 70.9 in

Accession#:   [id number]           Weight:       135 kg / 297.5 lbs

Sex:          M                     BSA:          2.49

Room#:        [facility]                 

Admit Date#:  2019          

Type:         Inpatient

 

Referring:    Kulwinder Freeman MD

Reading:      Sampson Dean MD

Sonographer:  Katiuska GuillenHUGO

CC:           Leonid Melara MD

______________________________________________________________________

 

Transthoracic Echocardiogram

 

Indication:

TIA

BP:           125/72

HR:           69

Rhythm:       NSR

 

Findings     

History:

Obesity, HTN, DM. 

 

Technical Comments:

The study quality is fair.  Completed at 1140. 

 

Left Ventricle:

The left ventricular chamber size is normal. Mild concentric left

ventricular hypertrophy is observed. Global left ventricular wall motion

and contractility are within normal limits. There is normal left

ventricular systolic function. The estimated ejection fraction is

55-60%.  Abnormal left ventricular diastolic function is observed. There

is an E to A reversal in the mitral valve flow pattern suggestive of

diastolic dysfunction. 

 

Left Atrium:

The left atrial chamber size is normal. 

 

Right Ventricle:

Moderator Band present. The right ventricle is mildly dilated.  The

right ventricle wall thickness is mildly increased. The right

ventricular global systolic function is normal. 

 

Right Atrium:

The right atrium is mild to moderately dilated.  Interatrial septum

appears intact without evidence of shunting.  The bubble study is

negative. A patent foramen ovale is not demonstrated with color Doppler

and agitated contrast.  

 

Aortic Valve:

The aortic valve is trileaflet. The aortic valve leaflets are mildly

thickened. There is a trace of aortic regurgitation. There is no

evidence of aortic stenosis. 

 

Mitral Valve:

The mitral valve leaflets are mildly thickened. There is trace to mild

mitral regurgitation. There is no evidence of mitral stenosis. 

 

Tricuspid Valve:

The tricuspid valve leaflets are normal.  There is trace to mild

tricuspid regurgitation. The right ventricular systolic pressure is

estimated at 27 mmHg.  There is no tricuspid stenosis. 

 

Pulmonic Valve:

The pulmonic valve appears normal. There is a trace pulmonic

regurgitation.  There is no pulmonic stenosis.  

 

Pericardium:

There is no significant pericardial effusion. A pericardial fat pad is

visualized. 

 

Aorta:

There is mild dilatation of the ascending aorta. There is no dilatation

of the aortic arch. The aortic root is normal in size. 

 

Pulmonary Artery:

The main pulmonary artery appears normal. 

 

Venous:

The inferior vena cava is dilated.  There is a greater than 50%

respiratory change in the inferior vena cava dimension. 

 

Contrast:

Normal saline was used as contrast for the bubble study.  Images on 78

and 79. Intravenous contrast was used to help determine presence of

intracardiac shunting. 

 

Summary:

There was not any prior study for comparison. 

 

Conclusions

Global left ventricular wall motion and contractility are within normal

limits.

There is normal left ventricular systolic function.

The estimated ejection fraction is 55-60%. 

Abnormal left ventricular diastolic function is observed.

The right ventricular global systolic function is normal.

A patent foramen ovale is not demonstrated with color Doppler and

agitated contrast. 

There is a trace of aortic regurgitation.

There is trace to mild mitral regurgitation.

There is trace to mild tricuspid regurgitation.

The right ventricular systolic pressure is estimated at 27 mmHg. 

There is no significant pericardial effusion.

 

Measurements     

Name                    Value         Normal Range            

RVIDd (AP) 2D           3.96 cm       (0.9 - 2.6)             

RVDdMajor (2D)          4.8 cm        (2.2 - 4.4)             

RVAW (2D)               0.6 cm        (0.2 - 0.5)             

RAd ISD 4CH             5.6 cm        (3.4 - 4.9)             

RA (A4C)W               4.9 cm        (2.9 - 4.6)             

IVSd (2D)               1.2 cm        (0.6 - 1)               

LVPWd (2D)              1.1 cm        (0.6 - 1)               

LVIDd (2D)              4.1 cm        (3.6 - 5.4)             

LVIDs (2D)              2.7 cm        -                        

LV FS (2D)              33 %          (25 - 45)               

Aortic Annulus          2.3 cm        (1.4 - 2.6)             

Ao root diameter (2D)   3.3 cm        (2.1 - 3.5)             

Ascending Ao            3.5 cm        (2.1 - 3.4)             

Aortic arch             3.1 cm        (1.8 - 3.4)             

LA dimension (AP) 2D    3.8 cm        (2.3 - 3.8)             

LAd ISD 4CH             5.3 cm        (2.9 - 5.3)             

LA ISD 4CH W            4.5 cm        (2.5 - 4.5)             

 

Name                    Value         Normal Range            

LA ESV BP (A/L) index   26 ml/m2      -                        

 

Name                    Value         Normal Range            

MV E-wave Vmax          0.6 m/sec     -                        

MV deceleration time    222 msec      -                        

MV A-wave Vmax          0.8 m/sec     -                        

MV E:A ratio            0.7 ratio     -                        

LV septal e' Vmax       0.07 m/sec    -                        

LV lateral e' Vmax      0.1 m/sec     -                        

LV E:e' septal ratio    8.6 ratio     -                        

LV E:e' lateral ratio   6 ratio       -                        

 

Name                    Value         Normal Range            

AV Vmax                 1.3 m/sec     -                        

AV VTI                  25 cm         -                        

AV peak gradient        7 mmHg        -                        

AV mean gradient        3 mmHg        -                        

LVOT Vmax               1 m/sec       -                        

LVOT VTI                25 cm         -                        

LVOT peak gradient      7 mmHg        -                        

LVOT mean gradient      3 mmHg        -                        

NORI Vmax                1 m/sec       -                        

 

Name                    Value         Normal Range            

TR Vmax                 2.2 m/sec     -                        

TR peak gradient        19 mmHg       -                        

RAP                     8 mmHg        -                        

RVSP                    27 mmHg       -                        

IVC diameter            2.4 cm        -                        

 

Name                    Value         Normal Range            

PV Vmax                 0.9 m/sec     -                        

PV peak gradient        3 mmHg        -                        

 

Electronically signed by: Sampson Dean MD on 2019 11:50:01

## 2019-03-26 NOTE — DS
CC:  Dr. Leonid Melara*

 

DATE OF ADMISSION:  03/25/2019.

 

DATE OF DISCHARGE:  03/26/2019.

 

PROVIDER:  Zhang Kang NP.

 

ATTENDING PHYSICIAN:  Dr. Gray* (report dictated by Zhang Kang NP).

 

PRIMARY CARE PHYSICIAN:  Dr. Leonid Melara.

 

CONSULTING NEUROLOGIST:  Dr. Iftikhar Malcolm.

 

DISCHARGE DIAGNOSIS:  Right-sided numbness and tingling, suspect possible TIA.

 

SECONDARY DIAGNOSES:

1.  Type 2 diabetes.

2.  Hypertension.

3.  Hyperlipidemia.

 

HISTORY OF PRESENT ILLNESS:  The patient presented to the emergency department 
on 03/26/2019 after he reported he was in his usual state of health when 
yesterday around 5:00 p.m. he developed an acute onset of right arm and leg 
numbness, tingling, and possible weakness.  He denied any specific weakness of 
the right side and did not have any facial weakness.  Per the wife's report, 
his face did turn red and he did not speak for about ten minutes and then 
started to cry afterwards. Symptoms were reported to last about an hour.  In 
the emergency department, it was reported that there was noted some weakness 
and ataxia.  Today he denies any weakness, numbness, or tingling in his arms or 
legs and reports that he feels back to his baseline.  Please see Dr. Malcolm's 
full dictated note for full details.

 

Prior to hospitalization, the patient was on aspirin.  Per Neurologist, he was 
loaded with Plavix and the plan will be to continue the patient on dual 
antiplatelet therapy with aspirin and Plavix for 30 days.  The patient is to 
stop the aspirin and continue the Plavix only as he may have aspirin 
resistance. Continue statin therapy.

 

IMAGING STUDIES:

1.  The patient did undergo a head and neck MRA which showed, impression:  "No 
aneurysm, vascular malformation, occlusion, or stenosis of the visualized 
intracranial circulation."

2.  A brain MRI, impression: "(1) There are multiple foci of elevated T2/FLAIR 
signal within the periventricular and subcortical white matter.  While these 
findings are nonspecific, they can be seen in association with migraine headache
, as the sequela of previous infection or inflammation, and chronic small 
vessel ischemia.  Demyelinating disease is also within the differential, but is 
considered less likely in the absence of the appropriate clinical presentation.
  (2) No restricted diffusion to suggest acute infarct."

3.  Transthoracic echocardiogram, conclusion:  "Global left ventricular wall 
motion and contractility are within normal limits.  There is normal left 
ventricular systolic function.  The estimated ejection fraction is 55 to 60 
percent.  Abnormal left ventricular diastolic function is observed.  The right 
ventricular global systolic function is normal.  A patent foramen ovale is not 
demonstrated with color Doppler and agitated contrast.  There is a trace of 
aortic regurgitation.  There is trace to mild mitral regurgitation.  There is 
trace to mild tricuspid regurgitation.  The right ventricular systolic pressure 
is estimated at 27 mmHg. There is no significant pericardial effusion."

 

LABORATORY DATA:  Labs are noted to be within normal limits, except I do note 
that his creatinine on admission was 1.62, today is down to 1.39.  Possible 
chronic kidney disease. Do not have any recent prior labs.  Last creatinine we 
have recorded is 1.08 in 2014.  The patient's triglycerides are 294, 
cholesterol 118, LDL 28, HDL 31.7.  The patient's blood sugars were monitored 
throughout the hospitalization and are within the range of 160 to 180.

 

He is to resume his home meds on discharge.  Recommend follow-up BMP in a week 
to monitor renal function if this is above the patient's baseline.

 

REVIEW OF SYMPTOMS:  A 14 point review of systems was performed.  All the 
pertinent positives and negatives are mentioned in the history of present 
illness, otherwise are negative.  The patient offers no complaints today and 
reports he feels at his baseline.

 

PHYSICAL EXAMINATION:  General:  Morbidly obese male, sitting up in a chair, 
alert and oriented times three, in no acute distress.  Pleasant and 
appropriate.  Vital Signs:  Temperature 97.8, heart rate 74, respirations 20, 
O2 sat 99 percent on room air, blood pressure 116/70.  HEENT:  Head is 
normocephalic , atraumatic.  Pupils equal and reactive to light.  Oropharynx is 
clear.  Moist mucous membranes. Cardiac:  Si1, S2, regular rate and rhythm.  No 
murmur or gallop appreciated.  No lower extremity edema noted.  Lungs:  Clear 
to auscultation bilaterally.  Good aeration throughout.  Abdomen:  Very obese, 
nondistended, soft, nontender. Difficult to auscultate bowel sounds.  
Extremities:  Moves all extremities equally. Strength is 4/4 throughout.  Neuro
:  Alert and oriented times three, no focal deficits noted.

 

DISCHARGE MEDICATIONS:

1.  Losartan/HCTZ 50/12.5 mg one tab p.o. daily.

2.  Lipitor 40 mg p.o. daily.

3.  Gabapentin 300 mg p.o. t.i.d.

4.  Glipizide 10 mg p.o. daily.

5.  Januvia 100 mg p.o. daily.

6.  Phentermine HCL 37.5 mg p.o. daily.

7.  Metformin 1,000 mg p.o. b.i.d.

8.  Aspirin 81 mg p.o. daily (patient is instructed to take this for one month 
per Neurology along with Plavix and should be discontinued after one month, 
continuing Plavix only).

9.  Jardiance 10 mg p.o. daily with meals.

 

New medication:  Plavix 75 mg p.o. daily.

 

DISCHARGE PLAN:

1.  The patient is stable for discharge to home.

2.  Follow-up with Neurology, Dr. Malcolm, in one month.

3.  Follow-up with Dr. Melara within five to seven days.

4.  Follow-up BMP to monitor renal function if this is above the patient's 
baseline.

 

The patient was instructed to return to the emergency department if he has any 
worsening or concerning symptoms.

 

TIME SPENT:  Approximately 60 minutes were spent on this discharge.

 

____________________________________ 

ZHANG KANG, DUGLAS

 

[id number]/CPS #: [id number]

ISREAL

## 2020-03-13 ENCOUNTER — HOSPITAL ENCOUNTER (EMERGENCY)
Dept: HOSPITAL 25 - UCEAST | Age: 60
Discharge: HOME | End: 2020-03-13
Payer: COMMERCIAL

## 2020-03-13 VITALS — DIASTOLIC BLOOD PRESSURE: 78 MMHG | SYSTOLIC BLOOD PRESSURE: 123 MMHG

## 2020-03-13 DIAGNOSIS — E11.9: ICD-10-CM

## 2020-03-13 DIAGNOSIS — Z79.899: ICD-10-CM
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
## 2020-03-13 DIAGNOSIS — Z23: ICD-10-CM
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
## 2020-03-13 DIAGNOSIS — S61.412A: Primary | ICD-10-CM

## 2020-03-13 DIAGNOSIS — F17.290: ICD-10-CM

## 2020-03-13 DIAGNOSIS — Z79.84: ICD-10-CM

## 2020-03-13 DIAGNOSIS — I10: ICD-10-CM

## 2020-03-13 DIAGNOSIS — L08.9: ICD-10-CM

## 2020-03-13 DIAGNOSIS — E78.5: ICD-10-CM

## 2020-03-13 PROCEDURE — 87640 STAPH A DNA AMP PROBE: CPT

## 2020-03-13 PROCEDURE — 99212 OFFICE O/P EST SF 10 MIN: CPT

## 2020-03-13 PROCEDURE — 90715 TDAP VACCINE 7 YRS/> IM: CPT

## 2020-03-13 PROCEDURE — 87186 SC STD MICRODIL/AGAR DIL: CPT

## 2020-03-13 PROCEDURE — G0463 HOSPITAL OUTPT CLINIC VISIT: HCPCS

## 2020-03-13 PROCEDURE — 87070 CULTURE OTHR SPECIMN AEROBIC: CPT

## 2020-03-13 PROCEDURE — 87077 CULTURE AEROBIC IDENTIFY: CPT

## 2020-03-13 PROCEDURE — 87641 MR-STAPH DNA AMP PROBE: CPT

## 2020-03-13 PROCEDURE — 87205 SMEAR GRAM STAIN: CPT

## 2020-03-13 NOTE — XMS REPORT
Summary of Care

 Created on:2020



Patient:Parish MACKENZIE Sr.

Sex:Male

:1960

External Reference #:[id number]





Demographics







 Address  [address] [place] [address]

 

 Home Phone  [phone]

 

 Work Phone  [phone]

 

 Mobile Phone  [phone]

 

 Email Address  [email or web address]

 

 Preferred Language  English

 

 Marital Status  Not  or 

 

 Pentecostal Affiliation  Unknown

 

 Race  White

 

 Ethnic Group  Not  or 









Author







 Organization  [facility]

 

 [address]  [address] [facility] [address]



   [place] [address]









Support







 Name  Relationship  Address  Phone

 

 Gladis Mackenzie  Unavailable  [address] [place] [address] ROAD  [phone]



     [place] [address]  

 

 Gladis Mackenzie  Unavailable  [address] [place] [address]  [phone]



     [place] [address]  









Care Team Providers







 Name  Role  Phone

 

 Leonid Melara  Primary Care Provider  [phone]









Reason for Visit







 Reason  Comments

 

 Diabetes  Follow up. Patient states his blood sugars are all over the place. 
Last A1C



   was 8.7 on 2020.







Encounter Details







 Date  Type  Department  Care Team  Description

 

 2020  Office Visit  [facility]  Leonid Melara,  Essential 
hypertension (Primary Dx);



     Medicine



  MD



  BMI 40.0-44.9, adult (Formerly [facility]);



     [address]



  [address]



  Uncontrolled type 2 diabetes mellitus with peripheral neuropathy (Formerly [facility])



     [place] [address]



  [place] [address]



  



     [phone] (Fax)  







Allergies







 Active Allergy  Reactions  Severity  Noted Date  Comments

 

 Lisinopril  Other    2014  cough



documented as of this encounter (statuses as of 2020)



Medications







 Medication  Sig  Dispensed  Refills  Start Date  End Date  Status

 

 Blood Glucose  by Does not apply route. 1. Brand: Any  1 Kit  0  2011    
Active



 Monitoring Suppl  2. Dx:250.02          



 (BLOOD GLUCOSE  3. non-Insulin dependent          



 METER) Does not  4. Test Blood Glucose 2 time(s) A DAY          



 apply            



 KitIndications:            



 Diabetes mellitus            



 (Formerly [facility])            

 

 Blood Glucose  1. Brand: per meter type  100 Strip  5  2011    Active



 Monitoring Suppl  2. Dx:250.02          



 (BLOOD GLUCOSE  3. non-Insulin dependent          



 TEST STRIPS  4. Test Blood Glucose 2 time(s) A DAY          



 STRP)Indications:            



 Diabetes mellitus            



 (HCC)            

 

 Lancets Does not  by Does not apply route. 1. Brand: per meter type  100 Each  
5  2011    Active



 apply  2. Dx:250.02          



 MiscIndications:  3. non-Insulin dependent          



 Diabetes mellitus  4. Test Blood Glucose 2 time(s) A DAY          



 (HCC)            

 

 Empagliflozin  Take 1 Tab by  90 Tab  5  2019    Active



 (JARDIANCE) 25 MG  mouth DAILY.          



 Oral Tab            

 

 JANUVIA 100 MG  TAKE 1 TABLET  90 Tab  3  07/15/2019    Active



 Oral  BY MOUTH ONCE          



 TabIndications:  DAILY          



 Diabetes mellitus            



 without            



 complication (HCC)            

 

 clopidogrel  Take 1 Tab by  90 Tab  3  07/15/2019    Active



 (PLAVIX) 75 MG  mouth DAILY.          



 Oral Tab            

 

 Losartan-Hydrochlo  Take 1 Tab by  90 Tab  3  07/15/2019    Active



 rothiazide 50-12.5  mouth DAILY.          



 MG Oral            



 TabIndications:            



 Benign            



 hypertension            

 

 atorvastatin  TAKE 1 TABLET  90 Tab  3  2019    Active



 (LIPITOR) 40 MG  BY MOUTH ONCE          



 Oral Tab  DAILY          

 

 ibuprofen (MOTRIN)  TAKE 1 TABLET  90 Tab  3  2019    Active



 800 MG Oral Tab  BY MOUTH EVERY          



   8 HOURS AS          



   NEEDED FOR          



   HEADACHE          

 

 metFORMIN HCL 1000  TAKE 1 TABLET  180 Tab  3  2020    Active



 MG Oral Tab  BY MOUTH TWICE          



   A DAY WITH          



   MEALS          

 

 Phentermine HCl  Take 1 Cap by  30 Cap  5  2020    Active



 37.5 MG Oral  mouth EVERY          



 CapIndications:  MORNING. Max          



 BMI 40.0-44.9,  Daily Amount:          



 adult (HCC)  37.5 mg.          

 

 gabapentin  Take 1 Cap by  180 Cap  3  2020    Active



 (NEURONTIN) 300 MG  mouth TWICE          



 Oral Cap  DAILY.          

 

 gabapentin  Take 1 Cap by  180 Cap  3  2019  Discontinued



 (NEURONTIN) 300 MG  mouth THREE        0  (Dose



 Oral  TIMES DAILY.          Adjustment)



 CapIndications:            



 Herpes zoster            

 

 Phentermine HCl  TAKE 1 CAPSULE  30 Cap  3  2019  Discontinued



 37.5 MG Oral  BY MOUTH        0  (Reorder)



 CapIndications:  DAILY. MAX          



 BMI 40.0-44.9,  DAILY AMOUNT:          



 adult (HCC)  37.5 MG          



documented as of this encounter (statuses as of 2020)



Active Problems







 Problem  Noted Date

 

 BMI 40.0-44.9, adult  2020

 

 History of TIA (transient ischemic attack)  10/29/2019

 

 Essential hypertension  2018

 

 Cortical cataract of both eyes  2017

 

 Complete tear of right rotator cuff  2017

 

 Rotator cuff tear arthropathy of right shoulder  2017

 

 BMI 37.0-37.9, adult  2013









 Overview: 



This patient's BMI has been calculated and is above average, and BMI management 
plan is completed.  General patient education discussion including:  weight 
loss link to reduction of risk factors for car



 diac and other diseases Referral to dietician.









 Uncontrolled type 2 diabetes mellitus with peripheral neuropathy  2012









 Overview: 







 Eye exam done 10/14, no problems noted









 Hearing loss, sensory  01/15/2010



documented as of this encounter (statuses as of 2020)



Resolved Problems







 Problem  Noted Date  Resolved Date

 

 Infected wound  2019

 

 Right shoulder pain  2017

 

 Tear of right rotator cuff  2017

 

 Neck abscess  2015

 

 Urine test positive for microalbuminuria  2014









 Overview: 







 On losartan.  Intolerant to lisinopril.









 Other specified hypothyroidism  01/10/2005  2020

 

 Depression  01/10/2005  2019



documented as of this encounter (statuses as of 2020)



Immunizations







 Name  Administration Dates  Next Due

 

 Influenza (IM) Preservative Free  10/29/2019, 2018, 10/06/2016,  



   10/27/2011  

 

 Influenza (IM) W/Pres  2014  

 

 PNEUMOCOCCAL POLYSACCHARIDE VACCINE  2018  

 

 Pneumococcal Conjugate(13 Valent)  10/29/2019  

 

 TDAP Vaccine  2014  



documented as of this encounter



Social History







 Tobacco Use  Types  Packs/Day  Years Used  Date

 

 Never Smoker        









 Smokeless Tobacco: Current User  Chew    









 Alcohol Use  Drinks/Week  oz/Week  Comments

 

 No  0 Standard drinks or equivalent  0.0  









 Sex Assigned at Birth  Date Recorded

 

 Not on file  



documented as of this encounter



Last Filed Vital Signs







 Vital Sign  Reading  Time Taken  Comments

 

 Blood Pressure  118/70  2020  9:13 AM EDT  

 

 Pulse  74  2020  9:13 AM EDT  

 

 Temperature  -  -  

 

 Respiratory Rate  -  -  

 

 Oxygen Saturation  -  -  

 

 Inhaled Oxygen Concentration  -  -  

 

 Weight  122.5 kg (270 lb)  2020  9:13 AM EDT  

 

 Height  180.3 cm (5' 11")  2020  9:13 AM EDT  

 

 Body Mass Index  37.66  2020  9:13 AM EDT  



documented in this encounter



Patient Instructions

Patient InstructionsLeonid Melara MD - 2020  9:00 AM EDTcontinue 
current medications

Look at your feet every day

You have lost 30 lb Congratulations!

Next goal weight is 260 lb

Diabetes mellitus is not under great control but it will get better with weight

Diabetes mellitus blood 3 month and then follow up me 3.5 monthElectronically 
signed by Leonid Melara MD at 2020  9:32 AM EDT

documented in this encounter



Progress Notes

Leonid Melara MD - 2020  9:00 AM EDTFormatting of this note might be 
different from the original.

PATIENT:  Parish MACKENZIE Sr.

MRN:  [id number]

:  1960

DATE OF SERVICE:  3/11/2020



CHIEF COMPLAINT:

Chief Complaint

Patient presents with

? Diabetes

  Follow up. Patient states his blood sugars are all over the place. Last A1C 
was 8.7 on 2020.



Subjective

HISTORY OF PRESENT ILLNESS:

Parish MACKENZIE Sr. is a 59-y.o. male.

HPI

Here for follow up to hypertension obesity and diabetes mellitus finger stick 
log reviewed it runs 140 premeal up to 230 post meal supper

He is on phentermine and it works well to control appetite he eats twice daily  
And has lost 30 pounds last year his long term goal is 200 pounds

Wt Readings from Last 3 Encounters:

20 270 lb (122.5 kg)

20 272 lb (123.4 kg)

10/29/19 276 lb (125.2 kg)

 he has redness heat and sweating in feet he uses gabapentin twice daily

He looks at feet daily

No cardiovascular or eye symptoms



Patient Active Problem List

Diagnosis

? Hearing loss, sensory

? Uncontrolled type 2 diabetes mellitus with peripheral neuropathy (HCC)

? BMI 37.0-37.9, adult

? Rotator cuff tear arthropathy of right shoulder

? Complete tear of right rotator cuff

? Cortical cataract of both eyes

? Essential hypertension

? History of TIA (transient ischemic attack)

? BMI 40.0-44.9, adult (Formerly [facility])



Family History

Problem Relation Age of Onset

? Diabetes Paternal Aunt

? Glaucoma No family history

? Blindness No family history

? Macular Degeneration No family history

? Other Eye Problems No family history



Current Outpatient Medications

Medication Sig

? atorvastatin (LIPITOR) 40 MG Oral Tab TAKE 1 TABLET BY MOUTH ONCE DAILY

? Blood Glucose Monitoring Suppl (BLOOD GLUCOSE METER) Does not apply Kit 
by Does not apply route. 1. Brand: Any

2. Dx:250.02

3. non-Insulin dependent

4. Test Blood Glucose 2 time(s) A DAY

? Blood Glucose Monitoring Suppl (BLOOD GLUCOSE TEST STRIPS STRP) 1. Brand
: per meter type

2. Dx:250.02

3. non-Insulin dependent

4. Test Blood Glucose 2 time(s) A DAY

? clopidogrel (PLAVIX) 75 MG Oral Tab Take 1 Tab by mouth DAILY.

? Empagliflozin (JARDIANCE) 25 MG Oral Tab Take 1 Tab by mouth DAILY.

? gabapentin (NEURONTIN) 300 MG Oral Cap Take 1 Cap by mouth TWICE DAILY.

? ibuprofen (MOTRIN) 800 MG Oral Tab TAKE 1 TABLET BY MOUTH EVERY 8 HOURS 
AS NEEDED FOR HEADACHE

? JANUVIA 100 MG Oral Tab TAKE 1 TABLET BY MOUTH ONCE DAILY

? Lancets Does not apply Misc by Does not apply route. 1. Brand: per 
meter type

2. Dx:250.02

3. non-Insulin dependent

4. Test Blood Glucose 2 time(s) A DAY

? Losartan-Hydrochlorothiazide 50-12.5 MG Oral Tab Take 1 Tab by mouth 
DAILY.

? metFORMIN HCL 1000 MG Oral Tab TAKE 1 TABLET BY MOUTH TWICE A DAY WITH 
MEALS

? Phentermine HCl 37.5 MG Oral Cap Take 1 Cap by mouth EVERY MORNING. Max 
Daily Amount: 37.5 mg.



No current facility-administered medications for this visit.



Allergies

Allergen Reactions

? Lisinopril Other

  cough



Social History



Socioeconomic History

? Marital status: 

  Spouse name: Not on file

? Number of children: Not on file

? Years of education: Not on file

? Highest education level: Not on file

Occupational History

? Not on file

Social Needs

? Financial resource strain: Not on file

? Food insecurity

  Worry: Not on file

  Inability: Not on file

? Transportation needs

  Medical: Not on file

  Non-medical: Not on file

Tobacco Use

? Smoking status: Never Smoker

? Smokeless tobacco: Current User

  Types: Chew

Substance and Sexual Activity

? Alcohol use: No

  Alcohol/week: 0.0 standard drinks

? Drug use: No

? Sexual activity: Not on file

Lifestyle

? Physical activity

  Days per week: Not on file

  Minutes per session: Not on file

? Stress: Not on file

Relationships

? Social connections

  Talks on phone: Not on file

  Gets together: Not on file

  Attends Oriental orthodox service: Not on file

  Active member of club or organization: Not on file

  Attends meetings of clubs or organizations: Not on file

  Relationship status: Not on file

? Intimate partner violence

  Fear of current or ex partner: Not on file

  Emotionally abused: Not on file

  Physically abused: Not on file

  Forced sexual activity: Not on file

Other Topics Concern

? Not on file

Social History Narrative

? Not on file



ROS no gastro-intestinal symptoms no genito-urinary symptoms

Objective

PHYSICAL EXAM:

VITALS:  /70  | Pulse 74  | Ht 5' 11" (1.803 m)  | Wt 270 lb (122.5 kg)  
| BMI 37.66 kg/mBody mass index is 37.66 kg/m.

Physical Exam

  S1 and S2 normal, no murmurs, clicks, gallops or rubs. Regular rate and 
rhythm. Chest is clear; nowheezes or rales. No edema or JVD. Left foot diabetic 
exam:

 Visual exam. Foot appears normal without wounds or signs of infection:  yes

 Sensory exam. Patient can feel the monofilament on the foot:  yes

 Pulse exam. A pulse is palpable at either the foot or ankle:  yes

Right foot diabetic exam:

 Visual exam. Foot appears normal without wounds or signs of infection:  yes

 Sensory exam. Patient can feel the monofilament on the foot:  yes

 Pulse exam. A pulse is palpable at either the foot or ankle:  yes

ASSESSMENT / IMPRESSION:

  ICD-9-CM ICD-10-CM

1. Essential hypertension at goal continue current medications  401.9 I10

2. BMI 40.0-44.9, adult (HCC) continue phentermine next goal weight 260 lb  
V85.41 Z68.41 Phentermine HCl 37.5 MG Oral Cap

3. Uncontrolled type 2 diabetes mellitus with peripheral neuropathy (HCC) 
continue current medications goal hemoglobin A1C 7% repeat 3 months

Look at feet daily  250.62 E11.42

 357.2 E11.65



Patient Instructions

continue current medications

Look at your feet every day

You have lost 30 lb Congratulations!

Next goal weight is 260 lb

Diabetes mellitus is not under great control but it will get better with weight

Diabetes mellitus blood 3 month and then follow up me 3.5 month



  Leonid Melara MD 3/11/2020 09:33Electronically signed by Leonid Melara MD at 2020  9:36 AM EDTdocumented in this encounter



Plan of Treatment







 Date  Type  Specialty  Care Team  Description

 

 2020  Lab  Internal Medicine    

 

 2020  Office Visit  Internal Medicine  Leonid Melara MD



  



       [address] [place] [address]



  



       [phone] (Fax)  

 

 2020  Ocular Visit  Optometry  Gil Bagley, OD



  



       [address]



  



       OPTOMETRY



  



       [place] [address] [phone] (Fax)  









 Health Maintenance  Due Date  Last Done  Comments

 

 CT Colonography  1960    

 

 FIT-DNA  1960    

 

 FIT/FOBT  1960    

 

 Sigmoidoscopy  1960    

 

 ZOSTER IMMUNIZATION SERIES  2008    



 (1 of 2)      

 

 FOOT EXAM  2019, 2018,  



     2017  

 

 HEMOGLOBIN A1C  2020, 10/29/2019,  



     2019, Additional  



     history exists  

 

 DEPRESSION SCREENING  2020, 2019  

 

 LIPID DISORDER SCREENING  2021, 2020,  



     2019, Additional  



     history exists  

 

 Diabetic Eye Exam  2021, 2019,  



     2019, Additional  



     history exists  

 

 Colonoscopy  2023, 2018,  



     2014, Additional  



     history exists  

 

 Colonoscopy  2023, 2018,  



     2014, Additional  



     history exists  

 

 Colorectal Cancer Screening  2023    

 

 DTaP/Tdap/Td Vaccines (2 -  2024  



 Tdap)      

 

 INFLUENZA VACCINE  Completed  10/29/2019, 2018,  



     10/06/2016, Additional  



     history exists  

 

 PNEUMOCOCCAL 0-64 YRS  Completed  10/29/2019, 2018  

 

 HEPATITIS A IMMUNIZATION  Aged Out    No longer eligible



 SERIES      based on patient's age



       to complete this topic

 

 HPV IMMUNIZATION SERIES  Aged Out    No longer eligible



       based on patient's age



       to complete this topic

 

 MENINGOCOCCAL VACCINE IMM  Aged Out    No longer eligible



       based on patient's age



       to complete this topic



documented as of this encounter



Goals







 Goal  Patient Goal  Associated  Recent  Patient-Stated?  Author



   Type  Problems  Progress    

 

 Blood Pressure  Blood Pressure    118/70  No  Delvin,



 < 140/90      (2020    LUCIANA Niño



       9:13 AM EDT)    









 Note: 



This is an individualized treatment (blood pressure) goal for Parish MACKENZIE Sr.:



 Displayed above (on the left) is your goal for blood pressure control.  Your 
most recent blood pressure is also shown above, on the right.



 You should try to achieve blood pressures that are lower than your goal listed 
above (on the left).









 Depression screen  Depression    0 (2019 11:24 AM  Renay Honeycutt FNP



 (PHQ-9) total score < 5      EDT)    









 Note: 



This is an individualized treatment (depression) goal for Parish Mackenzie Sr.:



 Displayed above is your goal for a depression screening (PHQ-9) score that 
would indicate good control of your depression.









 Diabetes < 7.0  Diabetes  Uncontrolled type 2  8.7 (2020  Renay Honeycutt,



     diabetes mellitus  3:17 PM EST)    FNP



     with peripheral      



     neuropathy      









 Note: 



Formatting of this note might be different from the original.



 Diabetes Care Plan



 



 According to current 2014 ADA guidelines the patient A1C goal is less than 7. 
The patient's last A1C was



 Lab Results



 Lab Results Value Date/Time



  GLYCO 10.1 2014 0732



  GLYCO 9.5 2014 1000



 



 The patient is:above goal .



 



 As your provider, it is important that I advise you regarding:



 



 

 your current medications and help you with any challenges you may face 
taking your medications as directed (ex. instructions, cost, side effects, and 
interactions).



 



 

 lifestyle changes:exercise, diet, glucose monitoring and medication 
compliance



 



 

 your clinical goals and how you can achieve success:weight reduction, 
exercise plan, diet management and glucose monitoring



 



 

 medication management: will check labs today and adjust if needed.



 



 

 patient education/self-management tools provided: No



 



 To successfully manage my Diabetes I will:



 



 

 have lab work every six months if my previous A1c was 7 or less. If my 
results were greater than 7, I will have lab work every three months. My goal 
is to control my diabetes by keeping A1c below 7.0



 



 

 take medications every day as prescribed by my healthcare provider and if 
unable to take them I will discuss with my provider.



 



 

 exercise/walk 30 minutes 5 day(s) per week. If I experience chest pain, 
chest tightness, or shortness of breath, I will seek medical attention 
immediately.



 



 

 check feet daily. If sores or irritation are noticed, will seek medical 
attention.



 



 

 follow a low carbohydrate and low fat diet. My goal is an LDL (bad 
cholesterol) number less than 100 when I have my routine lab work.



 



 

 check blood sugar as instructed and will call my healthcare provider if the 
results are consistently below 70 or above 300. I will monitor for symptoms of 
low blood sugar (feeling faint, dizzy, lig



 htheaded, jittery, sweaty, or hungry), if symptoms are noticed, I will eat or 
drink something (glucose tabs, orange juice, candy) to help raise sugar.



 



 

 record my blood sugar results (including dextrose sticks). sabio labschior[facility]rock is safe 
and secure way for you to do this in your medical record online.



 



 

 try to obtain an ideal body weight. My recent weight was Weight: 329 lb (
149.233 kg). My weight loss goal for my next office visit is lose 5#.



 



 

 to prevent kidney problems common to people with diabetes I will complete a 
yearly Microalbumin to check for protein in urine. I will talk with my 
healthcare provider about medications to prevent diabetic renal disease.



 



 

 to prevent diabetic retinopathy I will see an eye doctor yearly. A yearly 
dilated eye exam helps prevent blindness.



 



 

 if currently smoking, will discuss how to quit smoking with my healthcare 
provider and work towards quitting.



 



 Education Resources:



 



 American Diabetic Association Site  http://diabetes.org



 Academy of Nutrition & Dietetics Site  http://eatright.org



 National Smoking Cessation Site  http://smokefree.gov









 Glycohemoglobin A1c < 7.0  Diabetes    8.7 (2020  3:17  No  Renay Hargrove FNP



       PM EST)    









 Note: 



This is an individualized treatment (diabetes control, HgbA1C) goal for Parish Mackenzie Sr.:



 Displayed above is your progress towards your HgbA1C goal.  Your goal is shown 
above (on the left); your most recent HgbA1C is shown on the right.  Note that 
lower numbers are better.









 Weight loss vs. 18 mo  Lifestyle    32 (2020  9:13 AM  No  Renay Hargrove FNP



 max (lbs) >= 10      EDT)    









 Note: 



This is an individualized lifestyle goal for Parish Mackenzie Sr.:



 Your body mass index (BMI) is more than 30.  You should lose weight.  A 
reasonable starting goal is to lose 10 pounds.



 Displayed above is how many pounds you have lost thus far towards your 10 
pound weight loss goal.









 Keep a regular sleep schedule  Lifestyle      No  Renay Hargrove FNP









 Note: 



This is an individualized lifestyle goal for Parish Mackenzie Sr.:



 Please maintain a regular sleep schedule.  This may help with some symptoms of 
depression.









 Keep immunizations current  Lifestyle      No  Renay Hargrove FNP









 Note: 



This is an individualized lifestyle goal for Parish Mackenzie Sr.:



 Please be sure to keep up-to-date on recommended immunizations.  For example, 
this would include a yearly influenza vaccine.



 Immunization status can be seen by looking at the Health Maintenance sections 
of your e[facility], Plan of Care, and any After Visit Summaries.









 Take all prescribed medications as  Self-management      No  Renay Hargrove FNP



 directed          









 Note: 



This is an individualized self-management goal for Parish Mackenzie Sr.:



 Please take all prescribed medications as directed.



 1.  Do not skip doses.  If you cannot afford your medications, talk with your 
doctor.



 2.  Use a pill reminder system such as a pill box if needed.  Your pharmacist 
can help you with this.



 3.  Contact your Pharmacy 5 days before your medication runs out.  If you 
cannot take your medications for any reasons, talk with your doctor.



 4.  Please bring all of your medication bottles and inhalers (or a list of all 
your medications/inhalers) with you to every visit.



 Potential barriers to meeting all of your care plan goals will continue to be 
addressed on an ongoing basis.



documented as of this encounter



Implants







 Implanted  Type  Area    Device  Shelf  Model /



         Identifier  Expiration  Serial /



           Date  Lot

 

 Ventralex Patch -Med 6.4x6.4 - [id number]    [facility]  [employer], TRISH.      [id number] 
/



 Implanted: Qty: 1 on 2011 at [facility]             /



             [id number]

 

 5.5 Biocomposite Corkscrew/Fiber - [id number]    Right:  ARTHREX    2019  
[id number] /



 Implanted: Qty: 1 on 2018 by Dakota Silvestre MD at [facility] 
   Shoulder         /



             [id number]

 

 Swivel Lock [place] 4.75 - [id number]    Right:  ARTHREX    2019  [id number]
[id number] /



 Implanted: Qty: 2 on 2018 by Dakota Silvestre MD at [facility] 
   Shoulder         /



             [id number]



documented as of this encounter



Results

Not on filedocumented in this encounter



Visit Diagnoses







 Diagnosis

 

 Essential hypertension







 Unspecified essential hypertension

 

 BMI 40.0-44.9, adult (HCC)







 Body Mass Index 40.0-44.9, adult

 

 Uncontrolled type 2 diabetes mellitus with peripheral neuropathy (HCC)



documented in this encounter



Insurance







 Payer  Benefit Plan /  Subscriber ID  Effective Dates  Phone  Address  Type



   Group          

 

 [employer]  JOE SALAZARBS  [id number]  2017-Present      [place]









 Guarantor Name  Account Type  Relation to  Date of  Phone  Billing



     Patient  Birth    Address

 

 Osbaldo MACKENZIE  Personal/Family    1960  [phone]  [address]        (Home)



  [address]







         [phone]  [place], [place]



         (Work)  [phone]



documented as of this encounter

## 2020-03-13 NOTE — UC
Hand/Wrist HPI





- HPI Summary


HPI Summary: 





left hand dorsal surface  4 cm laceration  about 3 mm deep gaps 4 mm but pulls 

together well---occurred yesterday when he hit his hand on a ---erythema 

without streaking and purulent drainage





- History Of Current Complaint


Chief Complaint: UCWounds


Stated Complaint: HAND INJURY


Time Seen by Provider: 03/13/20 21:11


Hx Obtained From: Patient


Mechanism Of Injury: hand hit by a 


Onset/Duration: Sudden Onset, Lasting Days - 1, Still Present


Pain Intensity: 9


Pain Scale Used: 0-10 Numeric


Alleviating Factor(s): Nothing


Associated Signs And Symptoms: Positive: Swelling, Redness


Related History: Dominant Hand Right





- Allergies/Home Medications


Allergies/Adverse Reactions: 


 Allergies











Allergy/AdvReac Type Severity Reaction Status Date / Time


 


No Known Allergies Allergy   Verified 03/13/20 20:35











Home Medications: 


 Home Medications





Atorvastatin* [Lipitor 40 MG*] 40 mg PO DAILY 08/22/17 [History Confirmed 03/13/ 20]


Metformin HCl [Fortamet] 1,000 mg PO BID 08/22/17 [History Confirmed 03/13/20]


Sitagliptin Phosphate [Januvia] 100 mg PO DAILY 08/22/17 [History Confirmed 03/ 13/20]


glipiZIDE TAB.XL* [Glucotrol Xl*] 10 mg PO DAILY 08/22/17 [History Confirmed 03/ 13/20]


Empagliflozin [Jardiance] 10 mg PO DAILY WITH MEAL 03/25/19 [History Confirmed 

03/13/20]


Gabapentin 300 mg PO TID 03/25/19 [History Confirmed 03/13/20]


Losartan/Hydrochlorothiazide [Losartan-Hctz 50-12.5 mg Tab] 1 tab PO DAILY 03/25 /19 [History Confirmed 03/13/20]


Clopidogrel TAB* [Plavix TAB*] 75 mg PO DAILY #30 tab 03/26/19 [Rx Confirmed 03/ 13/20]


Amoxicillin/Clavulanate TAB* [Augmentin *] 875 mg PO BID #20 tab 03/13/ 20 [Rx]


DOXYcycline CAP(*) [DOXYcycline 100MG CAP(*)] 100 mg PO BID #20 cap 03/15/20 [Rx

]











PMH/Surg Hx/FS Hx/Imm Hx


Previously Healthy: No


Endocrine History: Diabetes, Dyslipidemia


Cardiovascular History: Hypertension





- Surgical History


Surgical History: Yes


Surgery Procedure, Year, and Place: HERNIA REPAIR.  Right shoulder surgery





- Family History


Known Family History: Positive: Cardiac Disease, Hypertension, Respiratory 

Disease - COPD





- Social History


Occupation: Works From/At Home


Lives: With Family


Alcohol Use: None


Substance Use Type: None


Smoking Status (MU): Current Some Day Smoker


Type: Smokeless Tobacco


Amount Used/How Often: 2 can weekly





Review of Systems


All Other Systems Reviewed And Are Negative: Yes


Constitutional: Positive: Negative


Skin: Positive: Other - laceration yesterday now red with purulent drainage


Eyes: Positive: Negative


ENT: Positive: Negative


Respiratory: Positive: Negative


Cardiovascular: Positive: Negative


Gastrointestinal: Positive: Negative


Genitourinary: Positive: Negative


Motor: Positive: Negative


Neurovascular: Positive: Negative


Musculoskeletal: Positive: Arthralgia - left hand


Neurological/Mental Status: Positive: Negative


Psychological: Positive: Negative


Is Patient Immunocompromised?: No





Physical Exam


Triage Information Reviewed: Yes


Appearance: Well-Appearing, No Pain Distress, Well-Nourished


Vital Signs: 


 Initial Vital Signs











Temp  98.4 F   03/13/20 20:31


 


Pulse  86   03/13/20 20:31


 


Resp  16   03/13/20 20:31


 


BP  123/78   03/13/20 20:31


 


Pulse Ox  98   03/13/20 20:31











Vital Signs Reviewed: Yes


Eye Exam: Normal


Eyes: Positive: Conjunctiva Clear


ENT Exam: Normal


ENT: Positive: Normal ENT inspection, Hearing grossly normal.  Negative: Nasal 

congestion, Trismus, Muffled voice, Hoarse voice


Dental Exam: Normal


Neck exam: Normal


Neck: Positive: Supple, Nontender


Respiratory Exam: Normal


Respiratory: Positive: Chest non-tender, No respiratory distress, No accessory 

muscle use


Cardiovascular Exam: Normal


Cardiovascular: Positive: RRR, Pulses Normal, Brisk Capillary Refill


Musculoskeletal Exam: Normal


Musculoskeletal: Positive: Strength Intact, ROM Intact, Edema @


Neurological Exam: Normal


Neurological: Positive: Alert, Muscle Tone Normal


Psychological Exam: Normal


Skin: Positive: Other - laceratal dorsal surface of left hand over 2nd 





Procedures





- Laceration/Wound Repair


  ** 1


Location: Other - dorsum of left hand


Description: Linear


Length, Depth and Shape: 4 cmx 3mmx4 mm


Laceration/Wound Explored: Other - wash and soaked in warm soapy water then 

cleaned with friction


Suture Type: Other - no closed- due to age  of wound


Layer Closure?: No


Sterile Dressing Applied?: No





Diagnostics





- Radiology


  ** No standard instances


Radiology Interpretation Completed By: ED Physician - no evidence of fb or 

fracture





Hand/Wrist Course/Dx





- Course


Course Of Treatment: 


dressing and ace wrap applied   Augmentin started supplies for wound care 

provided to patient----patient verbalizes understanding of d/c instructions, 

cleaning a/b and follow up care---tetanus updated about 5-6years ago








- Differential Dx/Diagnosis


Provider Diagnosis: 


 Laceration of left hand with infection








Discharge ED





- Sign-Out/Discharge


Documenting (check all that apply): Patient Departure


All imaging exams completed and their final reports reviewed: No Studies





- Discharge Plan


Condition: Stable


Disposition: HOME


Prescriptions: 


Amoxicillin/Clavulanate TAB* [Augmentin *] 875 mg PO BID #20 tab


DOXYcycline CAP(*) [DOXYcycline 100MG CAP(*)] 100 mg PO BID #20 cap


Patient Education Materials:  Wound Infection (ED), Warm Compress or Soak (ED)


Referrals: 


Uli Richardson MD [Medical Doctor] - 3 Days


Additional Instructions: 


1. Keep dressing clean and dry!  Change at least one time daily---


2. soak with warm soap and water 2-3 times a day


3. follow with orthopedic for recheck this week


4. to emergency department if swelling or redness worsens





- Billing Disposition and Condition


Condition: STABLE


Disposition: Home





- Attestation Statements


Provider Attestation: 





This patient was not seen by me.


I was available for consult.


Chart reviewed.


RAKAN

## 2020-03-13 NOTE — XMS REPORT
Summary of Care

 Created on:2020



Patient:Parish MACKENZIE Sr.

Sex:Male

:1960

External Reference #:[id number]





Demographics







 Address  [address] [place] [id number]

 

 Home Phone  [phone]

 

 Work Phone  [phone]

 

 Mobile Phone  [phone]

 

 Email Address  [email or web address]

 

 Preferred Language  English

 

 Marital Status  Not  or 

 

 Oriental orthodox Affiliation  Unknown

 

 Race  White

 

 Ethnic Group  Not  or 









Author







 Organization  [facility]

 

 Address  [address] [facility]



   [place] [address]









Support







 Name  Relationship  Address  Phone

 

 Gladis Mackenzie  Unavailable  [address] [place] [address]  [phone]



     [place] [address]  

 

 Gladis Mackenzie  Unavailable  [address] [place] [address]  [phone]



     [place] [address]  









Care Team Providers







 Name  Role  Phone

 

 Leonid Melara  Primary Care Provider  [phone]









Reason for Visit







 Reason  Comments

 

 Weight Check  Patient has lost 4lbs since 10/29/2019.







Encounter Details







 Date  Type  Department  Care Team  Description

 

 2020  Office Visit  [facility]  [place] Leonid JOSEPH,  BMI 40.0-44.9, 
adult ([facility]) (Primary Dx);



     Medicine



  MD



  Diabetes mellitus without complication ([facility]);



     [address]



  [address]



  Essential hypertension



     [place] [address]



  [place] [address]



  



     [phone] (Fax)  







Allergies







 Active Allergy  Reactions  Severity  Noted Date  Comments

 

 Lisinopril  Other    2014  cough



documented as of this encounter (statuses as of 2020)



Medications







 Medication  Sig  Dispensed  Refills  Start Date  End Date  Status

 

 Blood Glucose  by Does not apply route. 1. Brand: Any  1 Kit  0  2011    
Active



 Monitoring Suppl  2. Dx:250.02          



 (BLOOD GLUCOSE  3. non-Insulin dependent          



 METER) Does not  4. Test Blood Glucose 2 time(s) A DAY          



 apply            



 KitIndications:            



 Diabetes mellitus            



 (HCC)            

 

 Blood Glucose  1. Brand: per meter type  100 Strip  5  2011    Active



 Monitoring Suppl  2. Dx:250.02          



 (BLOOD GLUCOSE  3. non-Insulin dependent          



 TEST STRIPS  4. Test Blood Glucose 2 time(s) A DAY          



 STRP)Indications:            



 Diabetes mellitus            



 (HCC)            

 

 Lancets Does not  by Does not apply route. 1. Brand: per meter type  100 Each  
5  2011    Active



 apply  2. Dx:250.02          



 MiscIndications:  3. non-Insulin dependent          



 Diabetes mellitus  4. Test Blood Glucose 2 time(s) A DAY          



 ([facility])            

 

 metFORMIN HCL 1000  take 1 tablet  180 Tab  3  2019    Active



 MG Oral Tab  by mouth twice          



   a day with          



   meals          

 

 Empagliflozin  Take 1 Tab by  90 Tab  5  2019    Active



 (JARDIANCE) 25 MG  mouth DAILY.          



 Oral Tab            

 

 JANUVIA 100 MG  TAKE 1 TABLET  90 Tab  3  07/15/2019    Active



 Oral  BY MOUTH ONCE          



 TabIndications:  DAILY          



 Diabetes mellitus            



 without            



 complication ([facility])            

 

 clopidogrel  Take 1 Tab by  90 Tab  3  07/15/2019    Active



 (PLAVIX) 75 MG  mouth DAILY.          



 Oral Tab            

 

 Losartan-Hydrochlo  Take 1 Tab by  90 Tab  3  07/15/2019    Active



 rothiazide 50-12.5  mouth DAILY.          



 MG Oral            



 TabIndications:            



 Benign            



 hypertension            

 

 gabapentin  Take 1 Cap by  180 Cap  3  2019    Active



 (NEURONTIN) 300 MG  mouth THREE          



 Oral  TIMES DAILY.          



 CapIndications:            



 Herpes zoster            

 

 atorvastatin  TAKE 1 TABLET  90 Tab  3  2019    Active



 (LIPITOR) 40 MG  BY MOUTH ONCE          



 Oral Tab  DAILY          

 

 Phentermine HCl  TAKE 1 CAPSULE  30 Cap  3  2019    Active



 37.5 MG Oral  BY MOUTH          



 CapIndications:  DAILY. MAX          



 BMI 40.0-44.9,  DAILY AMOUNT:          



 adult (HCC)  37.5 MG          

 

 ibuprofen (MOTRIN)  TAKE 1 TABLET  90 Tab  3  2019    Active



 800 MG Oral Tab  BY MOUTH EVERY          



   8 HOURS AS          



   NEEDED FOR          



   HEADACHE          

 

 amoxicillin  Take 1 Tab by  14 Tab  0  2019  Discontinued



 (AMOXIL, POLYMOX,  mouth TWICE        0  (Error)



 TRIMOX) 875 MG  DAILY.          



 Oral Tab            

 

 oseltamivir  Take 1 Cap by  10 Cap  0  2020  Discontinued



 (TAMIFLU) 75 MG  mouth DAILY.        0  (Provider



 Oral Cap            Discontinued)



documented as of this encounter (statuses as of 2020)



Active Problems







 Problem  Noted Date

 

 History of TIA (transient ischemic attack)  10/29/2019

 

 Essential hypertension  2018

 

 Cortical cataract of both eyes  2017

 

 Complete tear of right rotator cuff  2017

 

 Rotator cuff tear arthropathy of right shoulder  2017

 

 BMI 40.0-44.9, adult  2013









 Overview: 



This patient's BMI has been calculated and is above average, and BMI management 
plan is completed.  General patient education discussion including:  weight 
loss link to reduction of risk factors for car



 diac and other diseases Referral to dietician.









 Controlled type 2 diabetes mellitus without complication, without  2012



 long-term current use of insulin  









 Overview: 







 Eye exam done 10/14, no problems noted









 Hearing loss, sensory  01/15/2010



documented as of this encounter (statuses as of 2020)



Resolved Problems







 Problem  Noted Date  Resolved Date

 

 Infected wound  2019

 

 Right shoulder pain  2017

 

 Tear of right rotator cuff  2017

 

 Neck abscess  2015

 

 Urine test positive for microalbuminuria  2014









 Overview: 







 On losartan.  Intolerant to lisinopril.









 Other specified hypothyroidism  01/10/2005  2020

 

 Depression  01/10/2005  2019



documented as of this encounter (statuses as of 2020)



Immunizations







 Name  Administration Dates  Next Due

 

 Influenza (IM) Preservative Free  10/29/2019, 2018, 10/06/2016,  



   10/27/2011  

 

 Influenza (IM) W/Pres  2014  

 

 PNEUMOCOCCAL POLYSACCHARIDE VACCINE  2018  

 

 Pneumococcal Conjugate(13 Valent)  10/29/2019  

 

 TDAP Vaccine  2014  



documented as of this encounter



Social History







 Tobacco Use  Types  Packs/Day  Years Used  Date

 

 Never Smoker        









 Smokeless Tobacco: Current User  Chew    









 Alcohol Use  Drinks/Week  oz/Week  Comments

 

 No  0 Standard drinks or equivalent  0.0  









 Sex Assigned at Birth  Date Recorded

 

 Not on file  









 Job Start Date  Occupation  Industry

 

 Not on file  Not on file  Not on file









 Travel History  Travel Start  Travel End









 No recent travel history available.



documented as of this encounter



Last Filed Vital Signs







 Vital Sign  Reading  Time Taken  Comments

 

 Blood Pressure  124/64  2020  2:06 PM EST  

 

 Pulse  100  2020  2:06 PM EST  

 

 Temperature  -  -  

 

 Respiratory Rate  -  -  

 

 Oxygen Saturation  94%  2020  2:06 PM EST  

 

 Inhaled Oxygen Concentration  -  -  

 

 Weight  123.4 kg (272 lb)  2020  2:06 PM EST  

 

 Height  180.3 cm (5' 11")  2020  2:06 PM EST  

 

 Body Mass Index  37.94  2020  2:06 PM EST  



documented in this encounter



Patient Instructions

Patient InstructionsLeonid Melara MD - 2020  2:00 PM ESTFinger stick 
once daily premeal &lt;120 or after meal &lt;180

Diabetes mellitus blood test today and in 3 months

Please check your blood pressure at home, mall, pharmacy, or grocery store 
three times per week.

Write these numbers down and bring them into your next doctor visit.

The goal blood pressure for you is less than : 140/90

Bring in your blood pressure machine

Continue phentermine

Think about dietitian here for diabetes mellitus

Next goal weight 12 weeks 10 lb weight lossElectronically signed by Leonid Melara MD at 2020  2:50 PM EST

documented in this encounter



Progress Notes

Leondi Melara MD - 2020  2:00 PM ESTFormatting of this note might be 
different from the original.

PATIENT:  Parish MACKENZIE Sr.

MRN:  [id number]

:  1960

DATE OF SERVICE:  2020



CHIEF COMPLAINT:

Chief Complaint

Patient presents with

 Weight Check

  Patient has lost 4lbs since 10/29/2019.



Subjective

HISTORY OF PRESENT ILLNESS:

Parish MACKENZIE Sr. is a 59-y.o. male.

HPI

Follow up to obesity hypertension diabetes mellitus

Lab Results

Component Value Date

 GLYCO 8.2 (H) 10/29/2019

 GLYCOHEMOGLO 7.6 (A) 2019

 he is not checking finger stick at all and struggles with sugar snacks and 
beverages

He continues to lose weight and feels the phentermine has helped with portion 
and calorie control hegets no regular  exercise

Wt Readings from Last 3 Encounters:

20 272 lb (123.4 kg)

10/29/19 276 lb (125.2 kg)

07/10/19 293 lb 6.4 oz (133.1 kg)

 no cardiovascular or gastro-intestinal symptoms



Patient Active Problem List

Diagnosis

 Hearing loss, sensory

 Controlled type 2 diabetes mellitus without complication, without long-
term current use of insulin ([facility])

 BMI 40.0-44.9, adult (HCC)

 Rotator cuff tear arthropathy of right shoulder

 Complete tear of right rotator cuff

 Cortical cataract of both eyes

 Essential hypertension

 History of TIA (transient ischemic attack)



Family History

Problem Relation Age of Onset

 Diabetes Paternal Aunt

 Glaucoma No family history

 Blindness No family history

 Macular Degeneration No family history

 Other Eye Problems No family history



Current Outpatient Medications

Medication Sig

 atorvastatin (LIPITOR) 40 MG Oral Tab TAKE 1 TABLET BY MOUTH ONCE DAILY

 Blood Glucose Monitoring Suppl (BLOOD GLUCOSE METER) Does not apply Kit 
by Does not apply route. 1. Brand: Any

2. Dx:250.02

3. non-Insulin dependent

4. Test Blood Glucose 2 time(s) A DAY

 Blood Glucose Monitoring Suppl (BLOOD GLUCOSE TEST STRIPS STRP) 1. Brand
: per meter type

2. Dx:250.02

3. non-Insulin dependent

4. Test Blood Glucose 2 time(s) A DAY

 clopidogrel (PLAVIX) 75 MG Oral Tab Take 1 Tab by mouth DAILY.

 Empagliflozin (JARDIANCE) 25 MG Oral Tab Take 1 Tab by mouth DAILY.

 gabapentin (NEURONTIN) 300 MG Oral Cap Take 1 Cap by mouth THREE TIMES 
DAILY.

 ibuprofen (MOTRIN) 800 MG Oral Tab TAKE 1 TABLET BY MOUTH EVERY 8 HOURS 
AS NEEDED FOR HEADACHE

 JANUVIA 100 MG Oral Tab TAKE 1 TABLET BY MOUTH ONCE DAILY

 Lancets Does not apply Misc by Does not apply route. 1. Brand: per meter 
type

2. Dx:250.02

3. non-Insulin dependent

4. Test Blood Glucose 2 time(s) A DAY

 Losartan-Hydrochlorothiazide 50-12.5 MG Oral Tab Take 1 Tab by mouth 
DAILY.

 metFORMIN HCL 1000 MG Oral Tab take 1 tablet by mouth twice a day with 
meals

 Phentermine HCl 37.5 MG Oral Cap TAKE 1 CAPSULE BY MOUTH DAILY. MAX 
DAILY AMOUNT: 37.5 MG



No current facility-administered medications for this visit.



Allergies

Allergen Reactions

 Lisinopril Other

  cough



Social History



Socioeconomic History

 Marital status: 

  Spouse name: Not on file

 Number of children: Not on file

 Years of education: Not on file

 Highest education level: Not on file

Occupational History

 Not on file

Social Needs

 Financial resource strain: Not on file

 Food insecurity

  Worry: Not on file

  Inability: Not on file

 Transportation needs

  Medical: Not on file

  Non-medical: Not on file

Tobacco Use

 Smoking status: Never Smoker

 Smokeless tobacco: Current User

  Types: Chew

Substance and Sexual Activity

 Alcohol use: No

  Alcohol/week: 0.0 standard drinks

 Drug use: No

 Sexual activity: Not on file

Lifestyle

 Physical activity

  Days per week: Not on file

  Minutes per session: Not on file

 Stress: Not on file

Relationships

 Social connections

  Talks on phone: Not on file

  Gets together: Not on file

  Attends Jew service: Not on file

  Active member of club or organization: Not on file

  Attends meetings of clubs or organizations: Not on file

  Relationship status: Not on file

 Intimate partner violence

  Fear of current or ex partner: Not on file

  Emotionally abused: Not on file

  Physically abused: Not on file

  Forced sexual activity: Not on file

Other Topics Concern

 Not on file

Social History Narrative

 Not on file



ROS no eye symptoms he feels gabapentin helps the neuropathy  Heat feeling in 
his feet

Objective

PHYSICAL EXAM:

VITALS:  /64  | Pulse 100  | Ht 5' 11" (1.803 m)  | Wt 272 lb (123.4 kg)  
| SpO2 94%  | BMI 37.94 kg/m  Body mass index is 37.94 kg/m.

Physical Exam

  Left foot diabetic exam:

 Visual exam. Foot appears normal without wounds or signs of infection:  yes

 Sensory exam. Patient can feel the monofilament on the foot:  yes

 Pulse exam. A pulse is palpable at either the foot or ankle:  yes

Right foot diabetic exam:

 Visual exam. Foot appears normal without wounds or signs of infection:  yes

 Sensory exam. Patient can feel the monofilament on the foot:  yes

 Pulse exam. A pulse is palpable at either the foot or ankle:  yes

I spent 25 minutes with the patient, greater than half of this in direct face 
to face counseling addressing the current condition and the plan of care.

ASSESSMENT / IMPRESSION:

  ICD-9-CM ICD-10-CM

1. BMI 40.0-44.9, adult (HCC) continue phentermine and add in daily 30 minutes 
walk reduce high carbohydrate snacks  V85.41 Z68.41

2. Diabetes mellitus without complication (HCC) not at goal continue current 
medications hemoglobin A1C today and 3 month  250.00 E11.9 COMPREHENSIVE 
METABOLIC PANEL

   GLYCOHEMOGLOBIN A1C

3. Essential hypertension at goal monitor at home and continue current 
medications  401.9 I10 LIPID PROFILE



Patient Instructions

Finger stick once daily premeal &lt;120 or after meal &lt;180

Diabetes mellitus blood test today and in 3 months

Please check your blood pressure at home, mall, pharmacy, or grocery store 
three times per week.

Write these numbers down and bring them into your next doctor visit.

The goal blood pressure for you is less than : 140/90

Bring in your blood pressure machine

Continue phentermine

Think about dietitian here for diabetes mellitus

Next goal weight 12 weeks 10 lb weight loss



  Leonid Melara MD 2020 15:22Electronically signed by Leonid Melara MD at 2020  3:24 PM ESTdocumented in this encounter



Plan of Treatment







 Date  Type  Specialty  Care Team  Description

 

 2020  Lab  Internal Medicine    

 

 2020  Office Visit  Internal Medicine  Leonid Melara MD



  



       [address]



  



       [place] [address]



  



       [phone] (Fax)  

 

 2020  Ocular Visit  Optometry  Gil Bagley, OD



  



       [address] [facility]



  



       OPTOMETRY



  



       [place] [address]



  



       [phone] (Fax)  









 Name  Type  Priority  Associated Diagnoses  Date/Time

 

 COMPREHENSIVE METABOLIC  Lab  Routine  Diabetes mellitus  2020  3:17 PM



 PANEL      without complication  EST



       (HCC)  

 

 GLYCOHEMOGLOBIN A1C  Lab  Routine  Diabetes mellitus  2020  3:17 PM



       without complication  EST



       (HCC)  

 

 LIPID PROFILE  Lab  Routine  Essential hypertension  2020  3:17 PM



         EST









 Health Maintenance  Due Date  Last Done  Comments

 

 ZOSTER IMMUNIZATION SERIES  2008    



 (1 of 2)      

 

 FOOT EXAM  2019, 2018,  



     2017  

 

 HEMOGLOBIN A1C  2020  10/29/2019, 2019,  



     2019, Additional  



     history exists  

 

 DEPRESSION SCREENING  2020, 2019  

 

 LIPID DISORDER SCREENING  2021, 2019,  



     10/14/2019, Additional  



     history exists  

 

 Diabetic Eye Exam  2021, 2019,  



     2019, Additional  



     history exists  

 

 Colonoscopy  2023, 2018,  



     2014, Additional  



     history exists  

 

 DTaP/Tdap/Td Vaccines (2 -  2024  



 Tdap)      

 

 INFLUENZA VACCINE  Completed  10/29/2019, 2018,  



     10/06/2016, Additional  



     history exists  

 

 PNEUMOCOCCAL 0-64 YRS  Completed  10/29/2019, 2018  

 

 HEPATITIS A IMMUNIZATION  Aged Out    No longer eligible



 SERIES      based on patient's age



       to complete this topic

 

 HPV IMMUNIZATION SERIES  Aged Out    No longer eligible



       based on patient's age



       to complete this topic

 

 MENINGOCOCCAL VACCINE IMM  Aged Out    No longer eligible



       based on patient's age



       to complete this topic



documented as of this encounter



Goals







 Goal  Patient Goal  Associated  Recent  Patient-Stated?  Author



   Type  Problems  Progress    

 

 Blood Pressure  Blood Pressure    124/64  No  Delvin,



 < 140/90      (2020    LUCIANA Niño



       2:06 PM EST)    









 Note: 



This is an individualized treatment (blood pressure) goal for Parish MACKENZIE Sr.:



 Displayed above (on the left) is your goal for blood pressure control.  Your 
most recent blood pressure is also shown above, on the right.



 You should try to achieve blood pressures that are lower than your goal listed 
above (on the left).









 Depression screen  Depression    0 (2019 11:24 AM  No  Renay Hargrove FNP



 (PHQ-9) total score < 5      EDT)    









 Note: 



This is an individualized treatment (depression) goal for Parish Mackenzie 
Sr.:



 Displayed above is your goal for a depression screening (PHQ-9) score that 
would indicate good control of your depression.









 Diabetes < 7.0  Diabetes  Controlled type 2  8.2 (10/29/2019  Renay Honeycutt,



     diabetes mellitus  3:52 PM EDT)    FNP



     without complication,      



     without long-term      



     current use of      



     insulin      









 Note: 



Formatting of this note might be different from the original.



 Diabetes Care Plan



 



 According to current 2014 ADA guidelines the patient A1C goal is less than 7. 
The patient's last A1C was



 Lab Results



 Lab Results Value Date/Time



  GLYCO 10.1 2014 0732



  GLYCO 9.5 2014 1000



 



 The patient is:above goal .



 



 As your provider, it is important that I advise you regarding:



 



 

 your current medications and help you with any challenges you may face 
taking your medications as directed (ex. instructions, cost, side effects, and 
interactions).



 



 

 lifestyle changes:exercise, diet, glucose monitoring and medication 
compliance



 



 

 your clinical goals and how you can achieve success:weight reduction, 
exercise plan, diet management and glucose monitoring



 



 

 medication management: will check labs today and adjust if needed.



 



 

 patient education/self-management tools provided: No



 



 To successfully manage my Diabetes I will:



 



 

 have lab work every six months if my previous A1c was 7 or less. If my 
results were greater than 7, I will have lab work every three months. My goal 
is to control my diabetes by keeping A1c below 7.0



 



 

 take medications every day as prescribed by my healthcare provider and if 
unable to take them I will discuss with my provider.



 



 

 exercise/walk 30 minutes 5 day(s) per week. If I experience chest pain, 
chest tightness, or shortness of breath, I will seek medical attention 
immediately.



 



 

 check feet daily. If sores or irritation are noticed, will seek medical 
attention.



 



 

 follow a low carbohydrate and low fat diet. My goal is an LDL (bad 
cholesterol) number less than 100 when I have my routine lab work.



 



 

 check blood sugar as instructed and will call my healthcare provider if the 
results are consistently below 70 or above 300. I will monitor for symptoms of 
low blood sugar (feeling faint, dizzy, lig



 htheaded, jittery, sweaty, or hungry), if symptoms are noticed, I will eat or 
drink something (glucose tabs, orange juice, candy) to help raise sugar.



 



 

 record my blood sugar results (including dextrose sticks). [employer] is safe 
and secure way for you to do this in your medical record online.



 



 

 try to obtain an ideal body weight. My recent weight was Weight: 329 lb (
149.233 kg). My weight loss goal for my next office visit is lose 5#.



 



 

 to prevent kidney problems common to people with diabetes I will complete a 
yearly Microalbumin to check for protein in urine. I will talk with my 
healthcare provider about medications to prevent diabetic renal disease.



 



 

 to prevent diabetic retinopathy I will see an eye doctor yearly. A yearly 
dilated eye exam helps prevent blindness.



 



 

 if currently smoking, will discuss how to quit smoking with my healthcare 
provider and work towards quitting.



 



 Education Resources:



 



 American Diabetic Association Site  http://diabetes.org



 Academy of Nutrition & Dietetics Site  http://eatright.org



 National Smoking Cessation Site  http://smokefree.gov









 Glycohemoglobin A1c < 7.0  Diabetes    8.2 (10/29/2019  3:52  No  Renay Hargrove FNP



       PM EDT)    









 Note: 



This is an individualized treatment (diabetes control, HgbA1C) goal for Parish 
L Swansbrough Sr.:



 Displayed above is your progress towards your HgbA1C goal.  Your goal is shown 
above (on the left); your most recent HgbA1C is shown on the right.  Note that 
lower numbers are better.









 Weight loss vs. 18 mo  Lifestyle    30 (2020  2:06 PM  No  Renay Hargrove FNP



 max (lbs) >= 10      EST)    









 Note: 



This is an individualized lifestyle goal for Parish Mackenzie Sr.:



 Your body mass index (BMI) is more than 30.  You should lose weight.  A 
reasonable starting goal is to lose 10 pounds.



 Displayed above is how many pounds you have lost thus far towards your 10 
pound weight loss goal.









 Keep a regular sleep schedule  Lifestyle      No  Renay Hargrove FNP









 Note: 



This is an individualized lifestyle goal for Parish Mackenzie Sr.:



 Please maintain a regular sleep schedule.  This may help with some symptoms of 
depression.









 Keep immunizations current  Lifestyle      No  Renay Hargrove FNP









 Note: 



This is an individualized lifestyle goal for Parish Mackenzie Sr.:



 Please be sure to keep up-to-date on recommended immunizations.  For example, 
this would include a yearly influenza vaccine.



 Immunization status can be seen by looking at the Health Maintenance sections 
of your [facility], Plan of Care, and any After Visit Summaries.









 Take all prescribed medications as  Self-management      No  Renay Hargrove FNP



 directed          









 Note: 



This is an individualized self-management goal for Parish Mackenzie Sr.:



 Please take all prescribed medications as directed.



 1.  Do not skip doses.  If you cannot afford your medications, talk with your 
doctor.



 2.  Use a pill reminder system such as a pill box if needed.  Your pharmacist 
can help you with this.



 3.  Contact your Pharmacy 5 days before your medication runs out.  If you 
cannot take your medications for any reasons, talk with your doctor.



 4.  Please bring all of your medication bottles and inhalers (or a list of all 
your medications/inhalers) with you to every visit.



 Potential barriers to meeting all of your care plan goals will continue to be 
addressed on an ongoing basis.



documented as of this encounter



Implants







 Implanted  Type  Area    Device  Shelf  Model /



         Identifier  Expiration  Serial /



           Date  Lot

 

 Ventralex Patch -Med 6.4x6.4 - [id number]    [facility]  VICKI DELACRUZ      [id number] 
/



 Implanted: Qty: 1 on 2011 at [facility]             /



             [id number]

 

 5.5 Biocomposite Corkscrew/Fiber - [id number]    Right:  [employer]    2019  
[id number] /



 Implanted: Qty: 1 on 2018 by Dakota Silvestre MD at [facility] 
   Shoulder         /



             [id number]

 

 Swivel Lock [place] 4.75 - [id number]    Right:  ARTHREX    2019  [id number]
[id number] /



 Implanted: Qty: 2 on 2018 by Dakota Silvestre MD at [facility] 
   Shoulder         /



             [id number]



documented as of this encounter



Results

Not on filedocumented in this encounter



Visit Diagnoses







 Diagnosis

 

 Diabetes mellitus without complication (HCC)







 Type II or unspecified type diabetes mellitus without mention of complication, 
not



 stated as uncontrolled

 

 Essential hypertension







 Unspecified essential hypertension

 

 BMI 40.0-44.9, adult (HCC)







 Body Mass Index 40.0-44.9, adult



documented in this encounter



Insurance







 Payer  Benefit Plan /  Subscriber ID  Effective Dates  Phone  Address  Type



   Group          

 

 [employer]  [employer]  xxxxxxxxxxxx  2017-Present      [employer]









 Guarantor Name  Account Type  Relation to  Date of  Phone  Billing



     Patient  Birth    Address

 

 Osbaldo MACKENZIE  Personal/Family    1960  [phone]  [address]        (Home)



  [address]







         [phone]  [place], [place]



         (Work)  [id number]



documented as of this encounter

## 2020-03-15 NOTE — [facility]
- Progress Note


Progress Note: 





3/15/2020


Final Wound culture report: positive for MRSA and Staph Aureus. 


Final sensitivity report still pending.


Pt's results discussed w/ DR Carter since Pt is taken Losartan/HCTZ and BActrim can

't be Rx


She recommends Doxycycline.


Please advised Pt to also take Doxycyclien PO which was sent to pharmacy. 

Advise to take Culturelle or probiotics to protect her GI system.


JAMES Austin PA-C








Course/Dx





- Diagnoses


Provider Diagnoses: 


 Laceration of left hand with infection








Discharge ED





- Sign-Out/Discharge


Documenting (check all that apply): Post-Discharge Follow Up


All imaging exams completed and their final reports reviewed: No Studies





- Discharge Plan


Condition: Stable


Disposition: HOME


Prescriptions: 


Amoxicillin/Clavulanate TAB* [Augmentin *] 875 mg PO BID #20 tab


DOXYcycline CAP(*) [DOXYcycline 100MG CAP(*)] 100 mg PO BID #20 cap


Patient Education Materials:  Wound Infection (ED), Warm Compress or Soak (ED)


Referrals: 


Uli Richardson MD [Medical Doctor] - 3 Days


Additional Instructions: 


1. Keep dressing clean and dry!  Change at least one time daily---


2. soak with warm soap and water 2-3 times a day


3. follow with orthopedic for recheck this week


4. to emergency department if swelling or redness worsens





- Billing Disposition and Condition


Condition: STABLE


Disposition: Home